# Patient Record
Sex: FEMALE | Race: WHITE | Employment: STUDENT | URBAN - METROPOLITAN AREA
[De-identification: names, ages, dates, MRNs, and addresses within clinical notes are randomized per-mention and may not be internally consistent; named-entity substitution may affect disease eponyms.]

---
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
## 2019-01-02 ASSESSMENT — ENCOUNTER SYMPTOMS
BLOOD IN STOOL: 0
CONSTIPATION: 0
SHORTNESS OF BREATH: 0
APNEA: 0
NAUSEA: 0
VOMITING: 0
DIARRHEA: 0
CHEST TIGHTNESS: 0
COUGH: 0
ABDOMINAL PAIN: 0

## 2019-01-08 ENCOUNTER — TELEPHONE (OUTPATIENT)
Dept: FAMILY MEDICINE CLINIC | Age: 19
End: 2019-01-08

## 2019-01-15 ENCOUNTER — OFFICE VISIT (OUTPATIENT)
Dept: FAMILY MEDICINE CLINIC | Age: 19
End: 2019-01-15
Payer: COMMERCIAL

## 2019-01-15 VITALS
SYSTOLIC BLOOD PRESSURE: 116 MMHG | TEMPERATURE: 98.1 F | RESPIRATION RATE: 12 BRPM | HEIGHT: 63 IN | DIASTOLIC BLOOD PRESSURE: 78 MMHG | BODY MASS INDEX: 28.21 KG/M2 | HEART RATE: 84 BPM | WEIGHT: 159.2 LBS

## 2019-01-15 DIAGNOSIS — F41.9 ANXIETY: ICD-10-CM

## 2019-01-15 PROCEDURE — G8482 FLU IMMUNIZE ORDER/ADMIN: HCPCS | Performed by: FAMILY MEDICINE

## 2019-01-15 PROCEDURE — 99213 OFFICE O/P EST LOW 20 MIN: CPT | Performed by: FAMILY MEDICINE

## 2019-01-15 PROCEDURE — 1036F TOBACCO NON-USER: CPT | Performed by: FAMILY MEDICINE

## 2019-01-15 PROCEDURE — G8417 CALC BMI ABV UP PARAM F/U: HCPCS | Performed by: FAMILY MEDICINE

## 2019-01-15 PROCEDURE — G8427 DOCREV CUR MEDS BY ELIG CLIN: HCPCS | Performed by: FAMILY MEDICINE

## 2019-01-15 RX ORDER — ESCITALOPRAM OXALATE 5 MG/1
5 TABLET ORAL DAILY
Qty: 30 TABLET | Refills: 2 | Status: SHIPPED | OUTPATIENT
Start: 2019-01-15 | End: 2019-02-22 | Stop reason: DRUGHIGH

## 2019-01-23 ASSESSMENT — ENCOUNTER SYMPTOMS
CHEST TIGHTNESS: 0
CONSTIPATION: 0
ABDOMINAL PAIN: 0
BLOOD IN STOOL: 0
VOMITING: 0
COUGH: 0
APNEA: 0
SHORTNESS OF BREATH: 0
NAUSEA: 0
DIARRHEA: 0

## 2019-02-05 ENCOUNTER — OFFICE VISIT (OUTPATIENT)
Dept: BEHAVIORAL/MENTAL HEALTH CLINIC | Age: 19
End: 2019-02-05
Payer: COMMERCIAL

## 2019-02-05 DIAGNOSIS — F41.1 GENERALIZED ANXIETY DISORDER: Primary | ICD-10-CM

## 2019-02-05 DIAGNOSIS — F33.1 MAJOR DEPRESSIVE DISORDER, RECURRENT EPISODE, MODERATE WITH ANXIOUS DISTRESS (HCC): ICD-10-CM

## 2019-02-05 PROCEDURE — 90791 PSYCH DIAGNOSTIC EVALUATION: CPT | Performed by: PSYCHOLOGIST

## 2019-02-05 ASSESSMENT — PATIENT HEALTH QUESTIONNAIRE - PHQ9
SUM OF ALL RESPONSES TO PHQ9 QUESTIONS 1 & 2: 1
4. FEELING TIRED OR HAVING LITTLE ENERGY: 1
6. FEELING BAD ABOUT YOURSELF - OR THAT YOU ARE A FAILURE OR HAVE LET YOURSELF OR YOUR FAMILY DOWN: 1
8. MOVING OR SPEAKING SO SLOWLY THAT OTHER PEOPLE COULD HAVE NOTICED. OR THE OPPOSITE, BEING SO FIGETY OR RESTLESS THAT YOU HAVE BEEN MOVING AROUND A LOT MORE THAN USUAL: 2
SUM OF ALL RESPONSES TO PHQ QUESTIONS 1-9: 10
3. TROUBLE FALLING OR STAYING ASLEEP: 2
1. LITTLE INTEREST OR PLEASURE IN DOING THINGS: 0
2. FEELING DOWN, DEPRESSED OR HOPELESS: 1
SUM OF ALL RESPONSES TO PHQ QUESTIONS 1-9: 10
5. POOR APPETITE OR OVEREATING: 1
7. TROUBLE CONCENTRATING ON THINGS, SUCH AS READING THE NEWSPAPER OR WATCHING TELEVISION: 1
9. THOUGHTS THAT YOU WOULD BE BETTER OFF DEAD, OR OF HURTING YOURSELF: 1
10. IF YOU CHECKED OFF ANY PROBLEMS, HOW DIFFICULT HAVE THESE PROBLEMS MADE IT FOR YOU TO DO YOUR WORK, TAKE CARE OF THINGS AT HOME, OR GET ALONG WITH OTHER PEOPLE: 1

## 2019-02-19 ENCOUNTER — OFFICE VISIT (OUTPATIENT)
Dept: FAMILY MEDICINE CLINIC | Age: 19
End: 2019-02-19
Payer: COMMERCIAL

## 2019-02-19 VITALS
BODY MASS INDEX: 27.74 KG/M2 | HEIGHT: 63 IN | OXYGEN SATURATION: 99 % | SYSTOLIC BLOOD PRESSURE: 116 MMHG | DIASTOLIC BLOOD PRESSURE: 78 MMHG | WEIGHT: 156.56 LBS | TEMPERATURE: 98.6 F | RESPIRATION RATE: 16 BRPM | HEART RATE: 93 BPM

## 2019-02-19 DIAGNOSIS — F41.9 ANXIETY: ICD-10-CM

## 2019-02-19 PROCEDURE — 1036F TOBACCO NON-USER: CPT | Performed by: FAMILY MEDICINE

## 2019-02-19 PROCEDURE — G8417 CALC BMI ABV UP PARAM F/U: HCPCS | Performed by: FAMILY MEDICINE

## 2019-02-19 PROCEDURE — 99213 OFFICE O/P EST LOW 20 MIN: CPT | Performed by: FAMILY MEDICINE

## 2019-02-19 PROCEDURE — G8427 DOCREV CUR MEDS BY ELIG CLIN: HCPCS | Performed by: FAMILY MEDICINE

## 2019-02-19 PROCEDURE — G8482 FLU IMMUNIZE ORDER/ADMIN: HCPCS | Performed by: FAMILY MEDICINE

## 2019-02-19 RX ORDER — HYDROXYZINE HYDROCHLORIDE 10 MG/1
10 TABLET, FILM COATED ORAL 3 TIMES DAILY PRN
Qty: 42 TABLET | Refills: 0 | Status: CANCELLED | OUTPATIENT
Start: 2019-02-19

## 2019-02-19 ASSESSMENT — PATIENT HEALTH QUESTIONNAIRE - PHQ9
SUM OF ALL RESPONSES TO PHQ QUESTIONS 1-9: 1
SUM OF ALL RESPONSES TO PHQ QUESTIONS 1-9: 1
2. FEELING DOWN, DEPRESSED OR HOPELESS: 1
1. LITTLE INTEREST OR PLEASURE IN DOING THINGS: 0
SUM OF ALL RESPONSES TO PHQ9 QUESTIONS 1 & 2: 1

## 2019-02-22 DIAGNOSIS — F41.9 ANXIETY: Primary | ICD-10-CM

## 2019-02-22 RX ORDER — ESCITALOPRAM OXALATE 10 MG/1
10 TABLET ORAL DAILY
Qty: 30 TABLET | Refills: 1 | Status: SHIPPED | OUTPATIENT
Start: 2019-02-22 | End: 2019-03-06 | Stop reason: DRUGHIGH

## 2019-02-24 ASSESSMENT — ENCOUNTER SYMPTOMS
APNEA: 0
NAUSEA: 0
COUGH: 0
ABDOMINAL PAIN: 0
SHORTNESS OF BREATH: 0
BLOOD IN STOOL: 0
CONSTIPATION: 0
VOMITING: 0
CHEST TIGHTNESS: 0
DIARRHEA: 0

## 2019-03-04 ENCOUNTER — PATIENT MESSAGE (OUTPATIENT)
Dept: FAMILY MEDICINE CLINIC | Age: 19
End: 2019-03-04

## 2019-03-06 RX ORDER — ESCITALOPRAM OXALATE 5 MG/1
5 TABLET ORAL DAILY
Qty: 30 TABLET | Refills: 3 | Status: SHIPPED | OUTPATIENT
Start: 2019-03-06 | End: 2019-04-16 | Stop reason: SDUPTHER

## 2019-03-13 ENCOUNTER — HOSPITAL ENCOUNTER (EMERGENCY)
Age: 19
Discharge: HOME | End: 2019-03-13
Attending: EMERGENCY MEDICINE
Payer: COMMERCIAL

## 2019-03-13 ENCOUNTER — TELEPHONE (OUTPATIENT)
Dept: FAMILY MEDICINE CLINIC | Age: 19
End: 2019-03-13

## 2019-03-13 ENCOUNTER — TELEPHONE (OUTPATIENT)
Dept: ADMINISTRATIVE | Age: 19
End: 2019-03-13

## 2019-03-13 ENCOUNTER — APPOINTMENT (OUTPATIENT)
Dept: CT IMAGING | Age: 19
End: 2019-03-13
Payer: COMMERCIAL

## 2019-03-13 VITALS
OXYGEN SATURATION: 99 % | SYSTOLIC BLOOD PRESSURE: 130 MMHG | RESPIRATION RATE: 16 BRPM | HEIGHT: 63 IN | TEMPERATURE: 97.6 F | DIASTOLIC BLOOD PRESSURE: 79 MMHG | BODY MASS INDEX: 25.69 KG/M2 | HEART RATE: 102 BPM | WEIGHT: 145 LBS

## 2019-03-13 DIAGNOSIS — G43.019 INTRACTABLE MIGRAINE WITHOUT AURA AND WITHOUT STATUS MIGRAINOSUS: ICD-10-CM

## 2019-03-13 DIAGNOSIS — S09.90XA CLOSED HEAD INJURY, INITIAL ENCOUNTER: Primary | ICD-10-CM

## 2019-03-13 PROCEDURE — 99283 EMERGENCY DEPT VISIT LOW MDM: CPT

## 2019-03-13 PROCEDURE — 70450 CT HEAD/BRAIN W/O DYE: CPT

## 2019-03-13 PROCEDURE — 6370000000 HC RX 637 (ALT 250 FOR IP): Performed by: EMERGENCY MEDICINE

## 2019-03-13 PROCEDURE — 6360000002 HC RX W HCPCS: Performed by: EMERGENCY MEDICINE

## 2019-03-13 PROCEDURE — 96372 THER/PROPH/DIAG INJ SC/IM: CPT

## 2019-03-13 RX ORDER — KETOROLAC TROMETHAMINE 30 MG/ML
60 INJECTION, SOLUTION INTRAMUSCULAR; INTRAVENOUS ONCE
Status: COMPLETED | OUTPATIENT
Start: 2019-03-13 | End: 2019-03-13

## 2019-03-13 RX ORDER — NITROFURANTOIN MACROCRYSTALS 50 MG/1
50 CAPSULE ORAL DAILY
COMMUNITY
End: 2020-04-03

## 2019-03-13 RX ORDER — TAMSULOSIN HYDROCHLORIDE 0.4 MG/1
0.4 CAPSULE ORAL DAILY
COMMUNITY

## 2019-03-13 RX ORDER — ONDANSETRON 4 MG/1
4 TABLET, ORALLY DISINTEGRATING ORAL ONCE
Status: COMPLETED | OUTPATIENT
Start: 2019-03-13 | End: 2019-03-13

## 2019-03-13 RX ADMIN — ONDANSETRON 4 MG: 4 TABLET, ORALLY DISINTEGRATING ORAL at 11:13

## 2019-03-13 RX ADMIN — KETOROLAC TROMETHAMINE 60 MG: 30 INJECTION, SOLUTION INTRAMUSCULAR at 11:12

## 2019-03-13 ASSESSMENT — ENCOUNTER SYMPTOMS
VOICE CHANGE: 0
CONSTIPATION: 0
SINUS PRESSURE: 0
SHORTNESS OF BREATH: 0
DIARRHEA: 0
BLOOD IN STOOL: 0
CHEST TIGHTNESS: 0
EYE PAIN: 0
BACK PAIN: 0
COUGH: 0
PHOTOPHOBIA: 1
WHEEZING: 0
ABDOMINAL PAIN: 0
STRIDOR: 0
TROUBLE SWALLOWING: 0
EYE DISCHARGE: 0
EYE REDNESS: 0
VOMITING: 0
CHOKING: 0
SORE THROAT: 0
FACIAL SWELLING: 0

## 2019-03-13 ASSESSMENT — PAIN - FUNCTIONAL ASSESSMENT: PAIN_FUNCTIONAL_ASSESSMENT: ACTIVITIES ARE NOT PREVENTED

## 2019-03-13 ASSESSMENT — PAIN DESCRIPTION - DESCRIPTORS: DESCRIPTORS: ACHING

## 2019-03-13 ASSESSMENT — PAIN DESCRIPTION - FREQUENCY: FREQUENCY: INTERMITTENT

## 2019-03-13 ASSESSMENT — PAIN SCALES - GENERAL
PAINLEVEL_OUTOF10: 3
PAINLEVEL_OUTOF10: 5

## 2019-03-13 ASSESSMENT — PAIN DESCRIPTION - PROGRESSION: CLINICAL_PROGRESSION: NOT CHANGED

## 2019-03-13 ASSESSMENT — PAIN DESCRIPTION - ONSET: ONSET: ON-GOING

## 2019-03-13 ASSESSMENT — PAIN DESCRIPTION - PAIN TYPE: TYPE: ACUTE PAIN

## 2019-03-13 ASSESSMENT — PAIN DESCRIPTION - LOCATION: LOCATION: HEAD

## 2019-03-15 ENCOUNTER — OFFICE VISIT (OUTPATIENT)
Dept: FAMILY MEDICINE CLINIC | Age: 19
End: 2019-03-15
Payer: COMMERCIAL

## 2019-03-15 VITALS
DIASTOLIC BLOOD PRESSURE: 82 MMHG | TEMPERATURE: 99 F | WEIGHT: 167 LBS | BODY MASS INDEX: 29.59 KG/M2 | RESPIRATION RATE: 12 BRPM | HEART RATE: 93 BPM | HEIGHT: 63 IN | SYSTOLIC BLOOD PRESSURE: 118 MMHG | OXYGEN SATURATION: 99 %

## 2019-03-15 DIAGNOSIS — R41.3 ACUTE MEMORY IMPAIRMENT: ICD-10-CM

## 2019-03-15 DIAGNOSIS — F41.9 ANXIETY: ICD-10-CM

## 2019-03-15 DIAGNOSIS — R51.9 ACUTE NONINTRACTABLE HEADACHE, UNSPECIFIED HEADACHE TYPE: Primary | ICD-10-CM

## 2019-03-15 PROCEDURE — G8482 FLU IMMUNIZE ORDER/ADMIN: HCPCS | Performed by: FAMILY MEDICINE

## 2019-03-15 PROCEDURE — G8417 CALC BMI ABV UP PARAM F/U: HCPCS | Performed by: FAMILY MEDICINE

## 2019-03-15 PROCEDURE — 99214 OFFICE O/P EST MOD 30 MIN: CPT | Performed by: FAMILY MEDICINE

## 2019-03-15 PROCEDURE — G8427 DOCREV CUR MEDS BY ELIG CLIN: HCPCS | Performed by: FAMILY MEDICINE

## 2019-03-15 PROCEDURE — 1036F TOBACCO NON-USER: CPT | Performed by: FAMILY MEDICINE

## 2019-03-16 ENCOUNTER — HOSPITAL ENCOUNTER (OUTPATIENT)
Dept: LAB | Age: 19
Discharge: HOME | End: 2019-03-16
Payer: COMMERCIAL

## 2019-03-16 DIAGNOSIS — R41.3 ACUTE MEMORY IMPAIRMENT: ICD-10-CM

## 2019-03-16 LAB
ANION GAP SERPL CALCULATED.3IONS-SCNC: 15 MEQ/L (ref 9–15)
BASOPHILS ABSOLUTE: 0 K/UL (ref 0–0.2)
BASOPHILS RELATIVE PERCENT: 0.4 %
BUN BLDV-MCNC: 10 MG/DL (ref 6–20)
CALCIUM SERPL-MCNC: 8.9 MG/DL (ref 8.5–9.9)
CHLORIDE BLD-SCNC: 105 MEQ/L (ref 95–107)
CO2: 20 MEQ/L (ref 20–31)
CREAT SERPL-MCNC: 0.6 MG/DL (ref 0.5–0.9)
EOSINOPHILS ABSOLUTE: 0.1 K/UL (ref 0–0.7)
EOSINOPHILS RELATIVE PERCENT: 2.3 %
GFR AFRICAN AMERICAN: >60
GFR NON-AFRICAN AMERICAN: >60
GLUCOSE BLD-MCNC: 87 MG/DL (ref 70–99)
HCT VFR BLD CALC: 41.5 % (ref 37–47)
HEMOGLOBIN: 14.1 G/DL (ref 12–16)
LYMPHOCYTES ABSOLUTE: 2.4 K/UL (ref 1–4.8)
LYMPHOCYTES RELATIVE PERCENT: 42.1 %
MCH RBC QN AUTO: 28.8 PG (ref 27–31.3)
MCHC RBC AUTO-ENTMCNC: 33.9 % (ref 33–37)
MCV RBC AUTO: 84.9 FL (ref 82–100)
MONOCYTES ABSOLUTE: 0.4 K/UL (ref 0.2–0.8)
MONOCYTES RELATIVE PERCENT: 7.6 %
NEUTROPHILS ABSOLUTE: 2.8 K/UL (ref 1.4–6.5)
NEUTROPHILS RELATIVE PERCENT: 47.6 %
PDW BLD-RTO: 13.7 % (ref 11.5–14.5)
PLATELET # BLD: 318 K/UL (ref 130–400)
POTASSIUM SERPL-SCNC: 4.6 MEQ/L (ref 3.4–4.9)
RBC # BLD: 4.88 M/UL (ref 4.2–5.4)
SODIUM BLD-SCNC: 140 MEQ/L (ref 135–144)
VITAMIN B-12: 304 PG/ML (ref 232–1245)
WBC # BLD: 5.8 K/UL (ref 4.5–11)

## 2019-03-16 PROCEDURE — 82607 VITAMIN B-12: CPT

## 2019-03-16 PROCEDURE — 36415 COLL VENOUS BLD VENIPUNCTURE: CPT

## 2019-03-16 PROCEDURE — 80048 BASIC METABOLIC PNL TOTAL CA: CPT

## 2019-03-16 PROCEDURE — 85025 COMPLETE CBC W/AUTO DIFF WBC: CPT

## 2019-03-22 ASSESSMENT — ENCOUNTER SYMPTOMS
SHORTNESS OF BREATH: 0
APNEA: 0
CONSTIPATION: 0
BLOOD IN STOOL: 0
VOMITING: 0
COUGH: 0
NAUSEA: 0
ABDOMINAL PAIN: 0
DIARRHEA: 0
CHEST TIGHTNESS: 0

## 2019-04-16 ENCOUNTER — TELEPHONE (OUTPATIENT)
Dept: FAMILY MEDICINE CLINIC | Age: 19
End: 2019-04-16

## 2019-04-16 DIAGNOSIS — F41.9 ANXIETY: Primary | ICD-10-CM

## 2019-04-16 RX ORDER — ESCITALOPRAM OXALATE 5 MG/1
5 TABLET ORAL DAILY
Qty: 90 TABLET | Refills: 1 | Status: SHIPPED | OUTPATIENT
Start: 2019-04-16 | End: 2019-11-06 | Stop reason: SDUPTHER

## 2019-04-16 NOTE — TELEPHONE ENCOUNTER
Patient's Mom wants to know if you can escitalopram (LEXAPRO) 5 MG tablet for 90 days to [facility] - [address], [address] [place] [address] Call patient when complete [phone]

## 2019-04-25 ENCOUNTER — HOSPITAL ENCOUNTER (OUTPATIENT)
Age: 19
Setting detail: SPECIMEN
Discharge: HOME | End: 2019-04-25
Payer: COMMERCIAL

## 2019-04-25 ENCOUNTER — OFFICE VISIT (OUTPATIENT)
Dept: FAMILY MEDICINE CLINIC | Age: 19
End: 2019-04-25
Payer: COMMERCIAL

## 2019-04-25 VITALS
SYSTOLIC BLOOD PRESSURE: 120 MMHG | RESPIRATION RATE: 24 BRPM | HEIGHT: 63 IN | TEMPERATURE: 97.1 F | HEART RATE: 106 BPM | BODY MASS INDEX: 29.95 KG/M2 | DIASTOLIC BLOOD PRESSURE: 74 MMHG | WEIGHT: 169 LBS | OXYGEN SATURATION: 98 %

## 2019-04-25 DIAGNOSIS — J02.9 SORE THROAT: ICD-10-CM

## 2019-04-25 DIAGNOSIS — H66.001 ACUTE SUPPURATIVE OTITIS MEDIA OF RIGHT EAR WITHOUT SPONTANEOUS RUPTURE OF TYMPANIC MEMBRANE, RECURRENCE NOT SPECIFIED: Primary | ICD-10-CM

## 2019-04-25 LAB — S PYO AG THROAT QL: NORMAL

## 2019-04-25 PROCEDURE — 87880 STREP A ASSAY W/OPTIC: CPT | Performed by: NURSE PRACTITIONER

## 2019-04-25 PROCEDURE — 99213 OFFICE O/P EST LOW 20 MIN: CPT | Performed by: NURSE PRACTITIONER

## 2019-04-25 PROCEDURE — 87070 CULTURE OTHR SPECIMN AEROBIC: CPT

## 2019-04-25 RX ORDER — AMOXICILLIN 875 MG/1
875 TABLET, COATED ORAL 2 TIMES DAILY
Qty: 20 TABLET | Refills: 0 | Status: SHIPPED | OUTPATIENT
Start: 2019-04-25 | End: 2019-05-05

## 2019-04-25 ASSESSMENT — ENCOUNTER SYMPTOMS
SORE THROAT: 1
SHORTNESS OF BREATH: 0
COUGH: 1
DIARRHEA: 0
SINUS PRESSURE: 1
RHINORRHEA: 1
ABDOMINAL PAIN: 0
VOMITING: 0
NAUSEA: 1
WHEEZING: 0

## 2019-04-25 NOTE — PROGRESS NOTES
Subjective  Luis Bar, 22 y.o. female presents today with:  Chief Complaint   Patient presents with    Pharyngitis     C/O sore throat 3x days. Patient tried cough drops with some relief      Luis Bar is a 22 y.o. female who presents for evaluation of sore throat. Associated symptoms include suspected fevers but not measured at home, chills, dry cough, right ear pain, headache, nasal blockage, pain while swallowing, sinus and nasal congestion and sore throat. Onset of symptoms was 3 days ago, gradually worsening since that time. She is drinking plenty of fluids. She has had recent close exposure to someone with proven streptococcal pharyngitis. Patient's medications, allergies, past medical, surgical, social and family histories were reviewed and updated as appropriate. Review of Systems   Constitutional: Positive for chills, fatigue and fever. HENT: Positive for congestion, ear pain, rhinorrhea, sinus pressure and sore throat. Negative for postnasal drip. Respiratory: Positive for cough. Negative for shortness of breath and wheezing. Cardiovascular: Negative for chest pain. Gastrointestinal: Positive for nausea. Negative for abdominal pain, diarrhea and vomiting. Genitourinary: Negative for dysuria, flank pain and frequency. Musculoskeletal: Positive for myalgias. Negative for neck stiffness. Neurological: Positive for headaches (recent concussion). Negative for dizziness and light-headedness. Hematological: Negative for adenopathy.      Objective  Vitals:    04/25/19 1652   BP: 120/74   Site: Left Upper Arm   Position: Sitting   Cuff Size: Small Adult   Pulse: 106   Resp: 24   Temp: 97.1 °F (36.2 °C)   TempSrc: Temporal   SpO2: 98%   Weight: 169 lb (76.7 kg)   Height: 5' 3\" (1.6 m)     Physical Exam   General Appearance:  Alert, cooperative, no distress, appears stated age  Head:  Normocephalic, without obvious abnormality, atraumatic  Eyes:  PERRL, conjunctiva/corneas clear, EOM's intact  both eyes  Ears:  R TM bulging, erythematous. L TM non-erythematous. Middle ear effusion. External canals clear, both ears  Nose: Nares normal, septum midline,mucosa normal, no drainage or sinus tenderness  Throat: Lips, mucosa, and tongue normal; teeth and gums normal. Oropharynx erythematous without exudate or edema  Neck: Supple, symmetrical, trachea midline, no adenopathy;  thyroid: not enlarged, symmetric, no tenderness/mass/nodules; no carotid bruit or JVD  Back:   Symmetric, no curvature, ROM normal, no CVA tenderness  Lungs:   Clear to auscultation bilaterally, respirations unlabored  Heart:  Regular rate and rhythm, S1 and S2 normal, no murmur, rub, or gallop  Extremities: Extremities normal, atraumatic, no cyanosis or edema  Skin: Skin color, texture, turgor normal, no visible rashes   Lymph nodes: Cervical, supraclavicular nodes normal  Neurologic: Normal   Vital signs reviewed    POC Testing Today:   Results for POC orders placed in visit on 04/25/19   POCT rapid strep A   Result Value Ref Range    Strep A Ag None Detected None Detected     Assessment & Plan   18 y. o.female presents w/ three days of worsening pharyngitis. Exam consistent with R otitis media. Pt placed on antibiotics. Instructions on supportive care and return precautions provided. Diagnoses and all orders for this visit:    Acute suppurative otitis media of right ear without spontaneous rupture of tympanic membrane, recurrence not specified  -     amoxicillin (AMOXIL) 875 MG tablet; Take 1 tablet by mouth 2 times daily for 10 days  - OTC analgesics per package instructions as needed for fever/pain  - Call/return if symptoms fail to improve in 2-3 days or worsen in any way    Sore throat  -     POCT rapid strep A  -     Throat culture; Future        - Use of OTC analgesics recommended as well as salt water gargles. - Follow up as needed.       Antibiotic Instructions: Complete the full course of antibiotics as ordered. Take each dose with a small snack or meal to lessen potential GI upset. To prevent antibiotic resistance, please take medication as ordered and for the full duration even if you start to feel better. Consider intake of yogurt or probiotic during antibiotic use and for a few days after to help reduce the risk of developing a secondary infection. Separate the yogurt and antibiotic by at least 1 hour. Avoid alcohol while taking antibiotics. Side effects and adverse effects of any medication prescribed today, as well as treatment plan/rationale, follow-up care, and result expectations have been discussed with the patient. Priya expresses understanding and wishes to proceed with the plan. Discussed signs and symptoms which require immediate follow-up in ED/call to 911. Understanding verbalized. I have reviewed and updated the electronic medical record.     Quita Rinne, APRN - CNP

## 2019-04-25 NOTE — PATIENT INSTRUCTIONS
The following comfort measures might be helpful:   Adequate rest and hydration    Over the counter pain relievers/ fever reducers as needed   2 Tbsp honey mixed w/warm tea or water or warm salt water gargles (1/2 teaspoon in 8oz H20) for sore throat   Vaporizer, humidifier, steamy showers, warm facial packs, sleeping w/ head of bed elevated    Prevention measures might include:   Avoid unfavorable environmental factors such as allergens, cigarette smoke, pollution as applicable   Frequent hand washing, cover your cough    Cover coughs and sneezes with the elbow or sleeve, not the hand       Antibiotic Instructions: Complete the full course of antibiotics as ordered. Take each dose with a small snack or meal to lessen potential GI upset. To prevent antibiotic resistance, please take medication as ordered and for the full duration even if you start to feel better. If you are using oral contraception, please use a back-up method of contraception such as condoms during antibiotic use and for 7 days after completing treatment to prevent pregnancy. Consider intake of yogurt or probiotic during antibiotic use and for a few days after to help reduce the risk of developing a secondary infection. Separate the yogurt and antibiotic by at least 1 hour. Avoid alcohol while taking antibiotics. Patient Education     Ear Infection (Otitis Media): Care Instructions  Your Care Instructions    An ear infection may start with a cold and affect the middle ear (otitis media). It can hurt a lot. Most ear infections clear up on their own in a couple of days. Most often you will not need antibiotics. This is because many ear infections are caused by a virus. Antibiotics don't work against a virus. Regular doses of pain medicines are the best way to reduce your fever and help you feel better. Follow-up care is a key part of your treatment and safety.  Be sure to make and go to all appointments, and call your doctor if you are having problems. It's also a good idea to know your test results and keep a list of the medicines you take. How can you care for yourself at home? · Take pain medicines exactly as directed. ? If the doctor gave you a prescription medicine for pain, take it as prescribed. ? If you are not taking a prescription pain medicine, take an over-the-counter medicine, such as acetaminophen (Tylenol), ibuprofen (Advil, Motrin), or naproxen (Aleve). Read and follow all instructions on the label. ? Do not take two or more pain medicines at the same time unless the doctor told you to. Many pain medicines have acetaminophen, which is Tylenol. Too much acetaminophen (Tylenol) can be harmful. · Plan to take a full dose of pain reliever before bedtime. Getting enough sleep will help you get better. · Try a warm, moist washcloth on the ear. It may help relieve pain. · If your doctor prescribed antibiotics, take them as directed. Do not stop taking them just because you feel better. You need to take the full course of antibiotics. When should you call for help? Call your doctor now or seek immediate medical care if:    · You have new or increasing ear pain.     · You have new or increasing pus or blood draining from your ear.     · You have a fever with a stiff neck or a severe headache.    Watch closely for changes in your health, and be sure to contact your doctor if:    · You have new or worse symptoms.     · You are not getting better after taking an antibiotic for 2 days. Where can you learn more? Go to https://YouView.Disqus. org and sign in to your Cloud[employer] account. Enter L518 in the [facility] box to learn more about \"Ear Infection (Otitis Media): Care Instructions. \"     If you do not have an account, please click on the \"Sign Up Now\" link. Current as of: March 27, 2018  Content Version: 11.9  © 4120-6182 [employer]. Care instructions adapted under license by [facility] ([facility]). If you have questions about a medical condition or this instruction, always ask your healthcare professional. [place] [address] any warranty or liability for your use of this information.

## 2019-04-28 LAB — THROAT CULTURE: NORMAL

## 2020-03-02 ENCOUNTER — OFFICE VISIT (OUTPATIENT)
Dept: FAMILY MEDICINE CLINIC | Age: 20
End: 2020-03-02
Payer: COMMERCIAL

## 2020-03-02 ENCOUNTER — HOSPITAL ENCOUNTER (OUTPATIENT)
Age: 20
Setting detail: SPECIMEN
Discharge: HOME | End: 2020-03-02
Payer: COMMERCIAL

## 2020-03-02 VITALS
OXYGEN SATURATION: 97 % | RESPIRATION RATE: 16 BRPM | BODY MASS INDEX: 34.66 KG/M2 | HEIGHT: 63 IN | WEIGHT: 195.6 LBS | DIASTOLIC BLOOD PRESSURE: 84 MMHG | HEART RATE: 119 BPM | SYSTOLIC BLOOD PRESSURE: 136 MMHG | TEMPERATURE: 97.9 F

## 2020-03-02 LAB
BILIRUBIN, POC: ABNORMAL
BLOOD URINE, POC: ABNORMAL
CLARITY, POC: ABNORMAL
COLOR, POC: YELLOW
GLUCOSE URINE, POC: ABNORMAL
HCG, URINE, POC: NEGATIVE
KETONES, POC: ABNORMAL
LEUKOCYTE EST, POC: ABNORMAL
Lab: NORMAL
NEGATIVE QC PASS/FAIL: NORMAL
NITRITE, POC: ABNORMAL
PH, POC: 6
POSITIVE QC PASS/FAIL: NORMAL
PROTEIN, POC: ABNORMAL
SPECIFIC GRAVITY, POC: 1.03
UROBILINOGEN, POC: ABNORMAL

## 2020-03-02 PROCEDURE — G8427 DOCREV CUR MEDS BY ELIG CLIN: HCPCS | Performed by: PHYSICIAN ASSISTANT

## 2020-03-02 PROCEDURE — G8417 CALC BMI ABV UP PARAM F/U: HCPCS | Performed by: PHYSICIAN ASSISTANT

## 2020-03-02 PROCEDURE — 87086 URINE CULTURE/COLONY COUNT: CPT

## 2020-03-02 PROCEDURE — 81025 URINE PREGNANCY TEST: CPT | Performed by: PHYSICIAN ASSISTANT

## 2020-03-02 PROCEDURE — G8484 FLU IMMUNIZE NO ADMIN: HCPCS | Performed by: PHYSICIAN ASSISTANT

## 2020-03-02 PROCEDURE — 99213 OFFICE O/P EST LOW 20 MIN: CPT | Performed by: PHYSICIAN ASSISTANT

## 2020-03-02 PROCEDURE — 1036F TOBACCO NON-USER: CPT | Performed by: PHYSICIAN ASSISTANT

## 2020-03-02 PROCEDURE — 81002 URINALYSIS NONAUTO W/O SCOPE: CPT | Performed by: PHYSICIAN ASSISTANT

## 2020-03-02 RX ORDER — SULFAMETHOXAZOLE AND TRIMETHOPRIM 800; 160 MG/1; MG/1
1 TABLET ORAL 2 TIMES DAILY
Qty: 14 TABLET | Refills: 0 | Status: SHIPPED | OUTPATIENT
Start: 2020-03-02 | End: 2020-03-09

## 2020-03-02 RX ORDER — ONDANSETRON 8 MG/1
8 TABLET, ORALLY DISINTEGRATING ORAL EVERY 8 HOURS PRN
Qty: 8 TABLET | Refills: 0 | Status: SHIPPED | OUTPATIENT
Start: 2020-03-02 | End: 2020-05-19

## 2020-03-02 ASSESSMENT — PATIENT HEALTH QUESTIONNAIRE - PHQ9
SUM OF ALL RESPONSES TO PHQ9 QUESTIONS 1 & 2: 0
1. LITTLE INTEREST OR PLEASURE IN DOING THINGS: 0
2. FEELING DOWN, DEPRESSED OR HOPELESS: 0
SUM OF ALL RESPONSES TO PHQ QUESTIONS 1-9: 0
SUM OF ALL RESPONSES TO PHQ QUESTIONS 1-9: 0

## 2020-03-03 NOTE — PROGRESS NOTES
Subjective     Peggy Greenwood 20 y.o. female presents 3/4/20 with   Chief Complaint   Patient presents with    Nausea     Patient presents today nausea that has been ongoing x3 days. Patient states she has not thrown up but has came very close to feeling as if she is. Patient states there is some abdominal pain and she is only able to stomach toast and bananas. Urinary Tract Infection    This is a new problem. The current episode started in the past 7 days (x 3 days). The problem occurs every urination. The problem has been unchanged. The quality of the pain is described as aching. The pain is mild. There has been no fever. She is sexually active. There is no history of pyelonephritis. Associated symptoms include frequency, nausea and urgency. Pertinent negatives include no chills, hematuria, possible pregnancy, sweats or vomiting. She has tried nothing for the symptoms. Pt also states her anxiety has been worse than usual which may be contributing to her nausea. She takes Lexapro. Reviewed the following history:    Past Medical History:   Diagnosis Date    Concussion     Leg pain     Ovarian cyst rupture     UTI (urinary tract infection)      Past Surgical History:   Procedure Laterality Date    MOUTH SURGERY      wisdom teeth     No family history on file.     No Known Allergies    Current Outpatient Medications   Medication Sig Dispense Refill    sulfamethoxazole-trimethoprim (BACTRIM DS) 800-160 MG per tablet Take 1 tablet by mouth 2 times daily for 7 days 14 tablet 0    ondansetron (ZOFRAN ODT) 8 MG TBDP disintegrating tablet Place 1 tablet under the tongue every 8 hours as needed for Nausea or Vomiting 8 tablet 0    escitalopram (LEXAPRO) 5 MG tablet Take 1 tablet by mouth daily 90 tablet 1    tamsulosin (FLOMAX) 0.4 MG capsule Take 0.4 mg by mouth daily      nitrofurantoin (MACRODANTIN) 50 MG capsule Take 50 mg by mouth Daily      Biotin w/ Vitamins C & E (HAIR SKIN & NAILS GUMMIES PO) Take by mouth      Multiple Vitamins-Minerals (MULTIVITAMIN GUMMIES ADULT PO) Take by mouth      Levonorgest-Eth Estrad 91-Day 0.1-0.02 & 0.01 MG TABS Take by mouth       No current facility-administered medications for this visit. Review of Systems   Constitutional: Negative for chills and fever. HENT: Negative for sore throat. Respiratory: Negative for cough. Cardiovascular: Negative for chest pain. Gastrointestinal: Positive for abdominal pain (lower), diarrhea (x 1 ) and nausea. Negative for blood in stool and vomiting. Genitourinary: Positive for frequency and urgency. Negative for hematuria. Musculoskeletal: Negative for back pain and myalgias. Objective    Vitals:    03/02/20 1947   BP: 136/84   Site: Right Upper Arm   Position: Sitting   Cuff Size: Large Adult   Pulse: 119   Resp: 16   Temp: 97.9 °F (36.6 °C)   TempSrc: Temporal   SpO2: 97%   Weight: 195 lb 9.6 oz (88.7 kg)   Height: 5' 3\" (1.6 m)       Physical Exam  Vitals signs reviewed. Constitutional:       General: She is not in acute distress. Appearance: She is well-developed. She is not ill-appearing, toxic-appearing or diaphoretic. HENT:      Head: Normocephalic and atraumatic. Eyes:      General:         Right eye: No discharge. Left eye: No discharge. Conjunctiva/sclera: Conjunctivae normal.   Cardiovascular:      Rate and Rhythm: Normal rate and regular rhythm. Heart sounds: Normal heart sounds. Pulmonary:      Effort: Pulmonary effort is normal.      Breath sounds: Normal breath sounds. Abdominal:      General: There is no distension. Palpations: Abdomen is soft. Tenderness: There is no abdominal tenderness. There is no right CVA tenderness or left CVA tenderness. Skin:     Coloration: Skin is not pale. Assessment and Plan      ICD-10-CM    1. Lower abdominal pain R10.30 POCT Urinalysis no Micro     POC Pregnancy Ur-Qual     Urine Culture   2.  Acute cystitis without hematuria N30.00    3. Nausea R11.0        Orders Placed This Encounter   Procedures    Urine Culture     Standing Status:   Future     Number of Occurrences:   1     Standing Expiration Date:   3/2/2021     Order Specific Question:   Specify (ex-cath, midstream, cysto, etc)? Answer:   midstream    POCT Urinalysis no Micro    POC Pregnancy Ur-Qual     Results for POC orders placed in visit on 03/02/20   POCT Urinalysis no Micro   Result Value Ref Range    Color, UA Yellow     Clarity, UA Cloudy     Glucose, UA POC -     Bilirubin, UA -     Ketones, UA 3+     Spec Grav, UA 1.030     Blood, UA POC -     pH, UA 6.0     Protein, UA POC -     Urobilinogen, UA +-     Leukocytes, UA 2+     Nitrite, UA -        Orders Placed This Encounter   Medications    sulfamethoxazole-trimethoprim (BACTRIM DS) 800-160 MG per tablet     Sig: Take 1 tablet by mouth 2 times daily for 7 days     Dispense:  14 tablet     Refill:  0    ondansetron (ZOFRAN ODT) 8 MG TBDP disintegrating tablet     Sig: Place 1 tablet under the tongue every 8 hours as needed for Nausea or Vomiting     Dispense:  8 tablet     Refill:  0       Reviewed with the patient: current clinicalstatus, medications, activities and diet. Side effects, adverse effects of the medication prescribed today, as well as treatment plan and result expectations have been discussed with the patient who expressesunderstanding and desires to proceed. Close follow up to evaluate treatment results and for coordination of care. I have reviewed the patient's medical history in detail and updated the computerized patientrecord. Return if symptoms worsen or fail to improve, for follow up with PCP.     GIULIANA Hernandez

## 2020-03-04 ASSESSMENT — ENCOUNTER SYMPTOMS
COUGH: 0
ABDOMINAL PAIN: 1
BLOOD IN STOOL: 0
VOMITING: 0
SORE THROAT: 0
NAUSEA: 1
DIARRHEA: 1
BACK PAIN: 0

## 2020-03-05 LAB — URINE CULTURE, ROUTINE: NORMAL

## 2020-04-03 ENCOUNTER — VIRTUAL VISIT (OUTPATIENT)
Dept: FAMILY MEDICINE CLINIC | Age: 20
End: 2020-04-03
Payer: COMMERCIAL

## 2020-04-03 PROCEDURE — 99214 OFFICE O/P EST MOD 30 MIN: CPT | Performed by: FAMILY MEDICINE

## 2020-04-03 RX ORDER — CLONAZEPAM 0.5 MG/1
0.25 TABLET ORAL 2 TIMES DAILY PRN
Qty: 8 TABLET | Refills: 0 | Status: SHIPPED | OUTPATIENT
Start: 2020-04-03 | End: 2020-05-19

## 2020-04-03 ASSESSMENT — ENCOUNTER SYMPTOMS
ABDOMINAL PAIN: 0
DIARRHEA: 0
NAUSEA: 0
CHEST TIGHTNESS: 0
CONSTIPATION: 0
SHORTNESS OF BREATH: 0
BLOOD IN STOOL: 0
COUGH: 0
VOMITING: 0
APNEA: 0

## 2020-04-03 NOTE — PROGRESS NOTES
breath. Cardiovascular: Negative for chest pain, palpitations and leg swelling. Gastrointestinal: Negative for abdominal pain, blood in stool, constipation, diarrhea, nausea and vomiting. Musculoskeletal: Negative for arthralgias. Neurological: Negative for seizures and headaches. Psychiatric/Behavioral: Positive for decreased concentration, dysphoric mood and sleep disturbance. Negative for agitation, behavioral problems, confusion, hallucinations, self-injury and suicidal ideas. The patient is nervous/anxious. The patient is not hyperactive. Prior to Visit Medications    Medication Sig Taking? Authorizing Provider   clonazePAM (KLONOPIN) 0.5 MG tablet Take 0.5 tablets by mouth 2 times daily as needed for Anxiety for up to 16 days.  Yes Lisa Singh MD   ondansetron (ZOFRAN ODT) 8 MG TBDP disintegrating tablet Place 1 tablet under the tongue every 8 hours as needed for Nausea or Vomiting  GIULIANA Killian   escitalopram (LEXAPRO) 5 MG tablet Take 1 tablet by mouth daily  Lisa Singh MD   tamsulosin (FLOMAX) 0.4 MG capsule Take 0.4 mg by mouth daily  Historical Provider, MD   Biotin w/ Vitamins C & E (HAIR SKIN & NAILS GUMMIES PO) Take by mouth  Historical Provider, MD   Multiple Vitamins-Minerals (MULTIVITAMIN GUMMIES ADULT PO) Take by mouth  Historical Provider, MD   Levonorgest-Eth Estrad 91-Day 0.1-0.02 & 0.01 MG TABS Take by mouth  Historical Provider, MD       Social History     Tobacco Use    Smoking status: Never Smoker    Smokeless tobacco: Never Used   Substance Use Topics    Alcohol use: No    Drug use: No        No Known Allergies,   Past Medical History:   Diagnosis Date    Concussion     Leg pain     Ovarian cyst rupture     UTI (urinary tract infection)    ,   Past Surgical History:   Procedure Laterality Date    MOUTH SURGERY      wisdom teeth   ,   Social History     Tobacco Use    Smoking status: Never Smoker    Smokeless tobacco: Never Used   Substance Use Topics  Alcohol use: No    Drug use: No   , No family history on file. PHYSICAL EXAMINATION:    [x] Alert  [x] Oriented to person/place/time    [x] No apparent distress     [] Sclera clear     [x] Breathing appears normal  \        [x] Cranial Nerves II-XII grossly intact    [x] Motor grossly intact in visible upper extremities    [x] Motor grossly intact in visible lower extremities    [x] Normal Mood  [] OTHER:    Patient appeared at her baseline with good eye contact and was smiling and laughing during her appointment. Speech was at a normal rate affect and mood appeared normal.   Due to this being a TeleHealth encounter, evaluation of the following organ systems is limited: Vitals/Constitutional/EENT/Resp/CV/GI//MS/Neuro/Skin/Heme-Lymph-Imm. ASSESSMENT/PLAN:  1. Anxiety  Treat  Anxiety as it appears to be the most worrisome symptom per patient. Goal will be to find an appropriate mood stabilizer and confirm diagnosis and wean off of benzodiazepines  - clonazePAM (KLONOPIN) 0.5 MG tablet; Take 0.5 tablets by mouth 2 times daily as needed for Anxiety for up to 16 days. Dispense: 8 tablet; Refill: 0    2. Mood disorder ([facility] [address]  Concern for bipolar disorder per interview and reviewing of MDQ  Will wean off of Lexapro slowly as it may be exacerbating symptoms. Start with half dose at 2.5 mg for first week  - Ambulatory referral to Psychiatry      Return in about 1 week (around 4/10/2020). An  electronic signature was used to authenticate this note.    --Nathan Villagomez MD on 4/3/2020 at 4:15 PM        Pursuant to the emergency declaration under the [facility], [address] [place] [address] authority and the [employer] Act, this Virtual  Visit was conducted, with patient's consent, to reduce the patient's risk of exposure to COVID-19 and provide continuity of care for an established patient.     Services were provided through a video synchronous discussion virtually to substitute for in-person clinic visit.

## 2020-04-07 ENCOUNTER — TELEPHONE (OUTPATIENT)
Dept: FAMILY MEDICINE CLINIC | Age: 20
End: 2020-04-07

## 2020-04-07 ENCOUNTER — VIRTUAL VISIT (OUTPATIENT)
Dept: FAMILY MEDICINE CLINIC | Age: 20
End: 2020-04-07
Payer: COMMERCIAL

## 2020-04-07 PROCEDURE — 99213 OFFICE O/P EST LOW 20 MIN: CPT | Performed by: FAMILY MEDICINE

## 2020-04-07 ASSESSMENT — ENCOUNTER SYMPTOMS
DIARRHEA: 0
SHORTNESS OF BREATH: 0
NAUSEA: 0
APNEA: 0
BLOOD IN STOOL: 0
ABDOMINAL PAIN: 0
CONSTIPATION: 0
VOMITING: 0
CHEST TIGHTNESS: 0
COUGH: 0

## 2020-04-07 NOTE — PROGRESS NOTES
palpitations and leg swelling. Gastrointestinal: Negative for abdominal pain, blood in stool, constipation, diarrhea, nausea and vomiting. Musculoskeletal: Negative for arthralgias. Neurological: Negative for seizures and headaches. Psychiatric/Behavioral: Positive for dysphoric mood. Negative for agitation, behavioral problems, confusion, decreased concentration, hallucinations, self-injury, sleep disturbance and suicidal ideas. The patient is hyperactive. The patient is not nervous/anxious. Prior to Visit Medications    Medication Sig Taking? Authorizing Provider   clonazePAM (KLONOPIN) 0.5 MG tablet Take 0.5 tablets by mouth 2 times daily as needed for Anxiety for up to 16 days.   Clara Senior MD   ondansetron (ZOFRAN ODT) 8 MG TBDP disintegrating tablet Place 1 tablet under the tongue every 8 hours as needed for Nausea or Vomiting  GIULIANA Manning   escitalopram (LEXAPRO) 5 MG tablet Take 1 tablet by mouth daily  Clara Senior MD   tamsulosin (FLOMAX) 0.4 MG capsule Take 0.4 mg by mouth daily  Historical Provider, MD   Biotin w/ Vitamins C & E (HAIR SKIN & NAILS GUMMIES PO) Take by mouth  Historical Provider, MD   Multiple Vitamins-Minerals (MULTIVITAMIN GUMMIES ADULT PO) Take by mouth  Historical Provider, MD   Levonorgest-Eth Estrad 91-Day 0.1-0.02 & 0.01 MG TABS Take by mouth  Historical Provider, MD       Social History     Tobacco Use    Smoking status: Never Smoker    Smokeless tobacco: Never Used   Substance Use Topics    Alcohol use: No    Drug use: No        No Known Allergies,   Past Medical History:   Diagnosis Date    Concussion     Leg pain     Ovarian cyst rupture     UTI (urinary tract infection)    ,   Past Surgical History:   Procedure Laterality Date    MOUTH SURGERY      wisdom teeth       PHYSICAL EXAMINATION:  [ INSTRUCTIONS:  \"[x]\" Indicates a positive item  \"[]\" Indicates a negative item  -- DELETE ALL ITEMS NOT EXAMINED]  [x] Alert  [x] Oriented to

## 2020-04-13 ENCOUNTER — VIRTUAL VISIT (OUTPATIENT)
Dept: BEHAVIORAL/MENTAL HEALTH CLINIC | Age: 20
End: 2020-04-13
Payer: COMMERCIAL

## 2020-04-13 PROCEDURE — 90792 PSYCH DIAG EVAL W/MED SRVCS: CPT | Performed by: PSYCHIATRY & NEUROLOGY

## 2020-04-13 RX ORDER — LAMOTRIGINE 25 MG/1
25 TABLET ORAL 2 TIMES DAILY
Qty: 60 TABLET | Refills: 2 | Status: SHIPPED | OUTPATIENT
Start: 2020-04-13 | End: 2020-08-04

## 2020-04-13 RX ORDER — PROPRANOLOL HYDROCHLORIDE 10 MG/1
10 TABLET ORAL 3 TIMES DAILY PRN
Qty: 90 TABLET | Refills: 3 | Status: SHIPPED | OUTPATIENT
Start: 2020-04-13 | End: 2021-01-29 | Stop reason: SDUPTHER

## 2020-04-22 ENCOUNTER — HOSPITAL ENCOUNTER (EMERGENCY)
Age: 20
Discharge: HOME | End: 2020-04-22
Attending: EMERGENCY MEDICINE
Payer: COMMERCIAL

## 2020-04-22 ENCOUNTER — APPOINTMENT (OUTPATIENT)
Dept: CT IMAGING | Age: 20
End: 2020-04-22
Payer: COMMERCIAL

## 2020-04-22 VITALS
BODY MASS INDEX: 28.35 KG/M2 | DIASTOLIC BLOOD PRESSURE: 85 MMHG | HEART RATE: 93 BPM | OXYGEN SATURATION: 99 % | HEIGHT: 63 IN | WEIGHT: 160 LBS | RESPIRATION RATE: 16 BRPM | TEMPERATURE: 98.1 F | SYSTOLIC BLOOD PRESSURE: 110 MMHG

## 2020-04-22 LAB
ALBUMIN SERPL-MCNC: 4.4 G/DL (ref 3.5–4.6)
ALP BLD-CCNC: 82 U/L (ref 40–130)
ALT SERPL-CCNC: 19 U/L (ref 0–33)
AMYLASE: 77 U/L (ref 22–93)
ANION GAP SERPL CALCULATED.3IONS-SCNC: 14 MEQ/L (ref 9–15)
AST SERPL-CCNC: 18 U/L (ref 0–35)
BACTERIA: ABNORMAL /HPF
BASOPHILS ABSOLUTE: 0 K/UL (ref 0–0.2)
BASOPHILS RELATIVE PERCENT: 0.4 %
BILIRUB SERPL-MCNC: <0.2 MG/DL (ref 0.2–0.7)
BUN BLDV-MCNC: 9 MG/DL (ref 6–20)
CALCIUM SERPL-MCNC: 9.2 MG/DL (ref 8.5–9.9)
CHLORIDE BLD-SCNC: 104 MEQ/L (ref 95–107)
CO2: 21 MEQ/L (ref 20–31)
CREAT SERPL-MCNC: 0.69 MG/DL (ref 0.5–0.9)
EOSINOPHILS ABSOLUTE: 0.2 K/UL (ref 0–0.7)
EOSINOPHILS RELATIVE PERCENT: 1.9 %
EPITHELIAL CELLS, UA: ABNORMAL /HPF
GFR AFRICAN AMERICAN: >60
GFR NON-AFRICAN AMERICAN: >60
GLOBULIN: 3.3 G/DL (ref 2.3–3.5)
GLUCOSE BLD-MCNC: 103 MG/DL (ref 70–99)
HCG(URINE) PREGNANCY TEST: NEGATIVE
HCT VFR BLD CALC: 40 % (ref 37–47)
HEMOGLOBIN: 13.6 G/DL (ref 12–16)
LIPASE: 32 U/L (ref 12–95)
LYMPHOCYTES ABSOLUTE: 2.9 K/UL (ref 1–4.8)
LYMPHOCYTES RELATIVE PERCENT: 27.8 %
MCH RBC QN AUTO: 28.3 PG (ref 27–31.3)
MCHC RBC AUTO-ENTMCNC: 33.9 % (ref 33–37)
MCV RBC AUTO: 83.5 FL (ref 82–100)
MONOCYTES ABSOLUTE: 0.7 K/UL (ref 0.2–0.8)
MONOCYTES RELATIVE PERCENT: 7 %
MUCUS: PRESENT /LPF
NEUTROPHILS ABSOLUTE: 6.5 K/UL (ref 1.4–6.5)
NEUTROPHILS RELATIVE PERCENT: 62.9 %
PDW BLD-RTO: 13.2 % (ref 11.5–14.5)
PLATELET # BLD: 361 K/UL (ref 130–400)
POTASSIUM SERPL-SCNC: 3.8 MEQ/L (ref 3.4–4.9)
RBC # BLD: 4.79 M/UL (ref 4.2–5.4)
RBC UA: ABNORMAL /HPF (ref 0–2)
SODIUM BLD-SCNC: 139 MEQ/L (ref 135–144)
TOTAL PROTEIN: 7.7 G/DL (ref 6.3–8)
WBC # BLD: 10.4 K/UL (ref 4.5–11)
WBC UA: ABNORMAL /HPF (ref 0–5)

## 2020-04-22 PROCEDURE — 2580000003 HC RX 258: Performed by: EMERGENCY MEDICINE

## 2020-04-22 PROCEDURE — 74176 CT ABD & PELVIS W/O CONTRAST: CPT

## 2020-04-22 PROCEDURE — 84703 CHORIONIC GONADOTROPIN ASSAY: CPT

## 2020-04-22 PROCEDURE — 81001 URINALYSIS AUTO W/SCOPE: CPT

## 2020-04-22 PROCEDURE — 80053 COMPREHEN METABOLIC PANEL: CPT

## 2020-04-22 PROCEDURE — 99284 EMERGENCY DEPT VISIT MOD MDM: CPT

## 2020-04-22 PROCEDURE — 82150 ASSAY OF AMYLASE: CPT

## 2020-04-22 PROCEDURE — 83690 ASSAY OF LIPASE: CPT

## 2020-04-22 PROCEDURE — 96375 TX/PRO/DX INJ NEW DRUG ADDON: CPT

## 2020-04-22 PROCEDURE — 6360000002 HC RX W HCPCS: Performed by: EMERGENCY MEDICINE

## 2020-04-22 PROCEDURE — 6370000000 HC RX 637 (ALT 250 FOR IP): Performed by: EMERGENCY MEDICINE

## 2020-04-22 PROCEDURE — 87086 URINE CULTURE/COLONY COUNT: CPT

## 2020-04-22 PROCEDURE — 96374 THER/PROPH/DIAG INJ IV PUSH: CPT

## 2020-04-22 PROCEDURE — 85025 COMPLETE CBC W/AUTO DIFF WBC: CPT

## 2020-04-22 RX ORDER — 0.9 % SODIUM CHLORIDE 0.9 %
1000 INTRAVENOUS SOLUTION INTRAVENOUS ONCE
Status: COMPLETED | OUTPATIENT
Start: 2020-04-22 | End: 2020-04-22

## 2020-04-22 RX ORDER — ACETAMINOPHEN 500 MG
1000 TABLET ORAL ONCE
Status: COMPLETED | OUTPATIENT
Start: 2020-04-22 | End: 2020-04-22

## 2020-04-22 RX ORDER — KETOROLAC TROMETHAMINE 30 MG/ML
30 INJECTION, SOLUTION INTRAMUSCULAR; INTRAVENOUS ONCE
Status: COMPLETED | OUTPATIENT
Start: 2020-04-22 | End: 2020-04-22

## 2020-04-22 RX ORDER — ONDANSETRON 4 MG/1
4 TABLET, ORALLY DISINTEGRATING ORAL EVERY 8 HOURS PRN
Qty: 10 TABLET | Refills: 0 | Status: SHIPPED | OUTPATIENT
Start: 2020-04-22 | End: 2022-05-16

## 2020-04-22 RX ORDER — TRAMADOL HYDROCHLORIDE 50 MG/1
50 TABLET ORAL EVERY 8 HOURS PRN
Qty: 15 TABLET | Refills: 0 | Status: SHIPPED | OUTPATIENT
Start: 2020-04-22 | End: 2020-04-27

## 2020-04-22 RX ORDER — ONDANSETRON 2 MG/ML
4 INJECTION INTRAMUSCULAR; INTRAVENOUS ONCE
Status: COMPLETED | OUTPATIENT
Start: 2020-04-22 | End: 2020-04-22

## 2020-04-22 RX ADMIN — KETOROLAC TROMETHAMINE 30 MG: 30 INJECTION, SOLUTION INTRAMUSCULAR at 16:53

## 2020-04-22 RX ADMIN — ONDANSETRON 4 MG: 2 INJECTION INTRAMUSCULAR; INTRAVENOUS at 16:52

## 2020-04-22 RX ADMIN — ACETAMINOPHEN 1000 MG: 500 TABLET ORAL at 16:59

## 2020-04-22 RX ADMIN — SODIUM CHLORIDE 1000 ML: 9 INJECTION, SOLUTION INTRAVENOUS at 16:52

## 2020-04-22 ASSESSMENT — PAIN DESCRIPTION - ORIENTATION
ORIENTATION: LEFT
ORIENTATION: LEFT

## 2020-04-22 ASSESSMENT — PAIN SCALES - GENERAL
PAINLEVEL_OUTOF10: 4
PAINLEVEL_OUTOF10: 4
PAINLEVEL_OUTOF10: 6

## 2020-04-22 ASSESSMENT — ENCOUNTER SYMPTOMS
TROUBLE SWALLOWING: 0
COUGH: 0
BACK PAIN: 0
STRIDOR: 0
DIARRHEA: 0
FACIAL SWELLING: 0
EYE PAIN: 0
CONSTIPATION: 0
WHEEZING: 0
VOICE CHANGE: 0
ABDOMINAL PAIN: 1
EYE REDNESS: 0
NAUSEA: 1
EYE DISCHARGE: 0
VOMITING: 0
CHOKING: 0
SINUS PRESSURE: 0
SORE THROAT: 0
BLOOD IN STOOL: 0
SHORTNESS OF BREATH: 0
CHEST TIGHTNESS: 0

## 2020-04-22 ASSESSMENT — PAIN DESCRIPTION - PROGRESSION
CLINICAL_PROGRESSION: GRADUALLY IMPROVING
CLINICAL_PROGRESSION: GRADUALLY IMPROVING

## 2020-04-22 ASSESSMENT — PAIN DESCRIPTION - PAIN TYPE
TYPE: ACUTE PAIN
TYPE: ACUTE PAIN

## 2020-04-22 ASSESSMENT — PAIN DESCRIPTION - ONSET
ONSET: ON-GOING
ONSET: ON-GOING

## 2020-04-22 ASSESSMENT — PAIN DESCRIPTION - DESCRIPTORS: DESCRIPTORS: ACHING

## 2020-04-22 ASSESSMENT — PAIN DESCRIPTION - LOCATION
LOCATION: FLANK
LOCATION: FLANK

## 2020-04-22 NOTE — ED PROVIDER NOTES
neck stiffness. Skin: Negative for pallor and rash. Neurological: Negative for tremors, seizures, syncope, weakness, numbness and headaches. Hematological: Negative for adenopathy. Does not bruise/bleed easily. Psychiatric/Behavioral: Negative for agitation, behavioral problems, hallucinations and sleep disturbance. The patient is not hyperactive. All other systems reviewed and are negative. Except as noted above the remainder of the review of systems was reviewed and negative. PAST MEDICAL HISTORY     Past Medical History:   Diagnosis Date    Concussion     Leg pain     Ovarian cyst rupture     UTI (urinary tract infection)          SURGICALHISTORY       Past Surgical History:   Procedure Laterality Date    MOUTH SURGERY      wisdom teeth         CURRENT MEDICATIONS       Previous Medications    BIOTIN W/ VITAMINS C & E (HAIR SKIN & NAILS GUMMIES PO)    Take by mouth    CLONAZEPAM (KLONOPIN) 0.5 MG TABLET    Take 0.5 tablets by mouth 2 times daily as needed for Anxiety for up to 16 days. ESCITALOPRAM (LEXAPRO) 5 MG TABLET    Take 1 tablet by mouth daily    LAMOTRIGINE (LAMICTAL) 25 MG TABLET    Take 1 tablet by mouth 2 times daily For the first week, take 1 tab QD, then if no rash, can increase to 1 tab BID after that    LEVONORGEST-ETH ESTRAD 91-DAY 0.1-0.02 & 0.01 MG TABS    Take by mouth    MULTIPLE VITAMINS-MINERALS (MULTIVITAMIN GUMMIES ADULT PO)    Take by mouth    ONDANSETRON (ZOFRAN ODT) 8 MG TBDP DISINTEGRATING TABLET    Place 1 tablet under the tongue every 8 hours as needed for Nausea or Vomiting    PROPRANOLOL (INDERAL) 10 MG TABLET    Take 1 tablet by mouth 3 times daily as needed (anxiety)    TAMSULOSIN (FLOMAX) 0.4 MG CAPSULE    Take 0.4 mg by mouth daily       ALLERGIES     Patient has no known allergies. FAMILY HISTORY     History reviewed. No pertinent family history.        SOCIAL HISTORY       Social History     Socioeconomic History    Marital status: Single regular rhythm. Pulses: Normal pulses. Heart sounds: Normal heart sounds. No murmur. No gallop. Pulmonary:      Effort: No respiratory distress. Breath sounds: Normal breath sounds. No wheezing. Abdominal:      General: Bowel sounds are normal. There is no distension. Palpations: Abdomen is soft. There is no mass. Tenderness: There is abdominal tenderness. There is no right CVA tenderness, guarding or rebound. Musculoskeletal: Normal range of motion. General: No tenderness. Skin:     General: Skin is warm. Findings: No erythema or rash. Neurological:      Mental Status: She is alert and oriented to person, place, and time. Cranial Nerves: No cranial nerve deficit. Motor: No abnormal muscle tone. Psychiatric:         Behavior: Behavior normal.         Thought Content: Thought content normal.         DIAGNOSTIC RESULTS     EKG: All EKG's are interpreted by the Emergency Department Physician who either signs or Co-signsthis chart in the absence of a cardiologist.        RADIOLOGY:   Derrel Rule such as CT, Ultrasound and MRI are read by the radiologist. Plain radiographic images are visualized and preliminarily interpreted by the emergency physician with the below findings:        Interpretation per the Radiologist below, if available at the time ofthis note:    CT ABDOMEN PELVIS WO CONTRAST Additional Contrast? None   Final Result      Nonobstructing right renal calculus. No left renal calculi. No hydronephrosis. No hydroureter/ureteral calculi. All CT scans at this facility use dose modulation, iterative reconstruction, and/or weight based dosing when appropriate to reduce radiation dose to as low as reasonably achievable.                      ED BEDSIDE ULTRASOUND:   Performed by ED Physician - none    LABS:  Labs Reviewed   COMPREHENSIVE METABOLIC PANEL - Abnormal; Notable for the following components:       Result Value    Glucose 103 (*)     All other components within normal limits   MICROSCOPIC URINALYSIS - Abnormal; Notable for the following components:    Bacteria, UA FEW (*)     All other components within normal limits   CULTURE, URINE   URINE RT REFLEX TO CULTURE   PREGNANCY, URINE   CBC WITH AUTO DIFFERENTIAL   LIPASE   AMYLASE       All other labs were within normal range or not returned as of this dictation. EMERGENCY DEPARTMENT COURSE and DIFFERENTIAL DIAGNOSIS/MDM:   Vitals:    Vitals:    04/22/20 1635   BP: (!) 145/85   Pulse: 115   Resp: 16   Temp: 99.6 °F (37.6 °C)   TempSrc: Oral   SpO2: 98%   Weight: 160 lb (72.6 kg)   Height: 5' 3\" (1.6 m)       MDM  Number of Diagnoses or Management Options  Diagnosis management comments: Is having pain to the left flank area noted to be low-grade fever sick at home no vomiting no diarrhea. of breath no chest pain urinalysis sent for culture but essentially negative blood test satisfactory CAT scan performed which is essentially negative for anything acute pathology patient is told about low-grade fever drinking fluids close follow-up in 2 days time or maybe video conference with your doctors for close follow-up on Friday in case you get worse patient understood very well. Patient educated about their test results which include potential coronavirus. Given that the patient has fever/chills, cough, n/v, myalgias, sob coronavirus was listed as one of her potential etiologies. Patient informed that the current [facility] Board recommendations are that if you symptoms are mild to go home and self quarantine for 2 weeks. Patient understands this and is in agreement of this plan. Patient given prescription for , given infection warning signs and will go home and self quarantine. Follow up with pcp. CRITICAL CARE TIME   Total Critical Care time was  minutes, excluding separately reportableprocedures.   There was a high probability of clinicallysignificant/life threatening deterioration in the patient's condition which required my urgent intervention. CONSULTS:  None    PROCEDURES:  Unless otherwise noted below, none     Procedures    FINAL IMPRESSION      1. Flank pain    2. Acute febrile illness    3. Nausea          DISPOSITION/PLAN   DISPOSITION        PATIENT REFERRED TO:  [facility] Myrna Lagos MD  [facility] [id number] [place] [address] Road  [phone]    In 2 days  If symptoms worsen, may do a video conference on Friday in case you get worse fever control hydration is important      DISCHARGE MEDICATIONS:  New Prescriptions    ONDANSETRON (ZOFRAN ODT) 4 MG DISINTEGRATING TABLET    Take 1 tablet by mouth every 8 hours as needed for Nausea    TRAMADOL (ULTRAM) 50 MG TABLET    Take 1 tablet by mouth every 8 hours as needed for Pain for up to 5 days.           (Please note that portions of this note were completed with a voice recognition program.  Efforts were made to edit the dictations but occasionally words are mis-transcribed.)    Gopal Blankenship MD (electronically signed)  Attending Emergency Physician       Gopal Blankenship MD  04/22/20 [address] [place] [address], MD  04/22/20 1317

## 2020-04-24 LAB — URINE CULTURE, ROUTINE: NORMAL

## 2020-04-28 ENCOUNTER — VIRTUAL VISIT (OUTPATIENT)
Dept: BEHAVIORAL/MENTAL HEALTH CLINIC | Age: 20
End: 2020-04-28
Payer: COMMERCIAL

## 2020-04-28 PROBLEM — F31.81 BIPOLAR 2 DISORDER (HCC): Status: ACTIVE | Noted: 2020-04-28

## 2020-04-28 PROCEDURE — 99214 OFFICE O/P EST MOD 30 MIN: CPT | Performed by: PSYCHIATRY & NEUROLOGY

## 2020-04-28 PROCEDURE — G8428 CUR MEDS NOT DOCUMENT: HCPCS | Performed by: PSYCHIATRY & NEUROLOGY

## 2020-04-28 NOTE — PROGRESS NOTES
examiner:  attentive and good eye contact  Speech:  spontaneous, normal rate and normal volume   Mood: \"better\", appears euthymic  Affect:  mood congruent  Thought processes:  linear and logical  Thought content:  Denies suicidal or homicidal ideation, denies auditory or visual hallucinations  Cognition:  no deficits in attention, concentration notable, recent memory grossly intact  Concentration intact  Memory intact  Insight good   Judgement fair   Fund of Knowledge adequate    Assessment:     Patient symptoms are:   [] Well controlled  [x] Improving  [] Worsening  [] No change    Pt is brianna for safety and denies thoughts of SI/HI. Amenable to plan. No acute concerns to address regarding medications. Medical Diagnoses:  Patient Active Problem List   Diagnosis    Excessive or frequent menstruation    Migraine with aura and without status migrainosus, not intractable    Acute intractable headache    Astigmatism    Idiopathic peripheral neuropathy    Muscle ache of extremity    Regular astigmatism, bilateral    Vitreous floaters of both eyes    Bipolar 2 disorder ([facility] [address]     Psychiatric Diagnoses:  1. Bipolar 2 disorder ([facility] [address]        Plan:  1. Start/Continue Lamictal 1 tab q day, pt is attempting a more slow taper 2/2 side effects  2. Continue propranolol 10 mg tid prn   3. Pt says she would switch to 2 tabs QPM once she increases propranolol   4. No Labs ordered today   5. Crisis plan reviewed and patient verbally contracts for safety. Go to ED with emergent symptoms or safety concerns. 6. Risks, benefits, side effects of medications, including any / all black box warnings, discussed with patient, who verbalizes their understanding. Patient denies any thoughts of harm to self and denies any acute safety concerns remains appropriate for outpatient level of care. Will continue to reassess with each appointment. Treatment Plan:  Reviewed current Medications with the patient.  Education for an established patient. Services were provided through a video synchronous discussion virtually to substitute for in-person clinic visit.

## 2020-05-12 ENCOUNTER — TELEPHONE (OUTPATIENT)
Dept: FAMILY MEDICINE CLINIC | Age: 20
End: 2020-05-12

## 2020-05-12 NOTE — TELEPHONE ENCOUNTER
lamoTRIgine (LAMICTAL) 25 MG tablet [[id number]]     Order Details   Dose: 25 mg Route: Oral Frequency: 2 TIMES DAILY   Dispense Quantity: 60 tablet Refills: 2 Fills remaining: --           Sig: Take 1 tablet by mouth 2 times daily For the first week, take 1 tab QD, then if no rash, can increase to 1 tab BID after that        Dr Karen Tucker,    Patient c/o that when she was taking  above Rx in the AM it mad her sleepy. Then when she switched to taking it in the PM it gives her insomnia. Please advise.

## 2020-05-19 ENCOUNTER — VIRTUAL VISIT (OUTPATIENT)
Dept: BEHAVIORAL/MENTAL HEALTH CLINIC | Age: 20
End: 2020-05-19
Payer: COMMERCIAL

## 2020-05-19 PROCEDURE — 99214 OFFICE O/P EST MOD 30 MIN: CPT | Performed by: PSYCHIATRY & NEUROLOGY

## 2020-05-19 PROCEDURE — G8428 CUR MEDS NOT DOCUMENT: HCPCS | Performed by: PSYCHIATRY & NEUROLOGY

## 2020-05-19 NOTE — PROGRESS NOTES
or homicidal ideation, denies auditory or visual hallucinations  Cognition:  no deficits in attention, concentration notable, recent memory grossly intact  Concentration intact  Memory intact  Insight good   Judgement fair   Fund of Knowledge adequate    Assessment:     Patient symptoms are:   [] Well controlled  [x] Improving  [] Worsening  [] No change    Pt is brianna for safety and denies thoughts of SI/HI. Amenable to plan. No acute concerns to address regarding medications. Medical Diagnoses:  Patient Active Problem List   Diagnosis    Excessive or frequent menstruation    Migraine with aura and without status migrainosus, not intractable    Acute intractable headache    Astigmatism    Idiopathic peripheral neuropathy    Muscle ache of extremity    Regular astigmatism, bilateral    Vitreous floaters of both eyes    Bipolar 2 disorder ([facility] [address]     Psychiatric Diagnoses:  1. Bipolar 2 disorder ([facility] [address]        Plan:    1. Start/Continue Lamictal 25 mg BID   2. Melatonin 2 mg QD   3. Propranolol 10 mg TID PRN anxiety  4. No Labs ordered today  5. Crisis plan reviewed and patient verbally contracts for safety. Go to ED with emergent symptoms or safety concerns. 6. Risks, benefits, side effects of medications, including any / all black box warnings, discussed with patient, who verbalizes their understanding. Patient denies any thoughts of harm to self and denies any acute safety concerns remains appropriate for outpatient level of care. Will continue to reassess with each appointment. Treatment Plan:  Reviewed current Medications with the patient. Education provided on the compliance with treatment.    Reviewed OARRs, no concerns identified    The anticipated benefits and side effects of the medications, including the anticipated results of not receiving the medication, and of alternatives to the medications were explained to the patient and their informed consent was obtained for starting

## 2020-06-28 LAB
BILIRUBIN URINE: NORMAL
BLOOD, URINE: NEGATIVE
CLARITY: CLEAR
COLOR: YELLOW
GLUCOSE URINE: NEGATIVE MG/DL
KETONES, URINE: NEGATIVE MG/DL
LEUKOCYTE ESTERASE, URINE: NORMAL
NITRITE, URINE: NEGATIVE
PH UA: 6 (ref 5–9)
PROTEIN UA: NEGATIVE MG/DL
SPECIFIC GRAVITY UA: >=1.03 (ref 1–1.03)
URINE REFLEX TO CULTURE: NORMAL
UROBILINOGEN, URINE: 0.2 E.U./DL

## 2021-01-19 RX ORDER — LAMOTRIGINE 25 MG/1
25 TABLET ORAL 2 TIMES DAILY
Qty: 60 TABLET | Refills: 0 | Status: SHIPPED | OUTPATIENT
Start: 2021-01-19 | End: 2021-01-29 | Stop reason: SDUPTHER

## 2021-01-19 RX ORDER — LAMOTRIGINE 25 MG/1
25 TABLET ORAL 2 TIMES DAILY
Qty: 60 TABLET | Refills: 0 | Status: CANCELLED | OUTPATIENT
Start: 2021-01-19

## 2021-01-29 ENCOUNTER — VIRTUAL VISIT (OUTPATIENT)
Dept: BEHAVIORAL/MENTAL HEALTH CLINIC | Age: 21
End: 2021-01-29
Payer: COMMERCIAL

## 2021-01-29 DIAGNOSIS — F31.81 BIPOLAR 2 DISORDER (HCC): Primary | ICD-10-CM

## 2021-01-29 PROCEDURE — G8428 CUR MEDS NOT DOCUMENT: HCPCS | Performed by: PSYCHIATRY & NEUROLOGY

## 2021-01-29 PROCEDURE — 99214 OFFICE O/P EST MOD 30 MIN: CPT | Performed by: PSYCHIATRY & NEUROLOGY

## 2021-01-29 RX ORDER — LAMOTRIGINE 100 MG/1
50 TABLET ORAL 2 TIMES DAILY
Qty: 30 TABLET | Refills: 3 | Status: SHIPPED | OUTPATIENT
Start: 2021-01-29 | End: 2021-06-08

## 2021-01-29 RX ORDER — PROPRANOLOL HYDROCHLORIDE 10 MG/1
10 TABLET ORAL 3 TIMES DAILY PRN
Qty: 90 TABLET | Refills: 3 | Status: SHIPPED | OUTPATIENT
Start: 2021-01-29 | End: 2021-02-25 | Stop reason: SDUPTHER

## 2021-01-29 NOTE — PROGRESS NOTES
1/29/2021    TELEHEALTH PSYCHIATRY FOLLOWUP -- Audio/Visual (During TKRIC-92 public health emergency)      Psychiatric Diagnoses:  1. Bipolar 2 disorder (HCC)          Due to COVID 19 outbreak, patient's office visit was converted to a virtual visit. Patient was contacted and agreed to proceed with a virtual visit via DOXY  30 minutes with direct communication with patient for encounter The risks and benefits of converting to a virtual visit were discussed in light of the current infectious disease epidemic. Patient also understood that insurance coverage and co-pays are up to their individual insurance plans. Patient Location:        Patient's home address  Provider Location (City/State):        [address]    Assessment/Plan:       Patient tolerating current medication regimen but will proceed with changes below in order to address continued mood symptoms. Medication has been helpful so far and patient has had no side effects. Patient is less depressed, less symptoms of hypomania, and still motivated to complete work. Less  anxiety throughout the day, however able to attend to ADLs. Continue to encourage physical activity and adherence to medication regimen. No acute concerns voiced. Medical Diagnoses:  Patient Active Problem List   Diagnosis    Excessive or frequent menstruation    Migraine with aura and without status migrainosus, not intractable    Acute intractable headache    Astigmatism    Idiopathic peripheral neuropathy    Muscle ache of extremity    Regular astigmatism, bilateral    Vitreous floaters of both eyes    Bipolar 2 disorder (HCC)           DATE and changes made  ? 5/19/20  o Propranolol 10 mg TID PRN anxiety  o Lamictal 25 mg BID   o Melatonin 2 mg QHS   o Patient says she has talked to [facility] [address] and will call next week to schedule her follow up   ? 1/29/21  o Propranolol 10 mg TID PRN anxiety  o Lamictal 25 mg BID   ?  INCREASE LAMICTAL to 50 mg BID   o Melatonin 2 mg QHS At today's visit, pt reports that since our last visit, their average Energy has been     [] Poor    [x] Average  [] Increased         Aggression:  [] yes  [x] no    Patient is [x] Able to contract for safety  [] unable to CONTRACT FOR SAFETY     ROS:  [x] All negative/unchanged except if checked. Explain positive(checked items) below:       [] Constitutional  [] Eyes  [] Ear/Nose/Mouth/Throat  [] Respiratory  [] CV  [] GI  []   [] Musculoskeletal  [] Skin/Breast  [] Neurological  [] Endocrine  [] Heme/Lymph  [] Allergic/Immunologic      MEDICATIONS:    Current Outpatient Medications:     lamoTRIgine (LAMICTAL) 25 MG tablet, Take 1 tablet by mouth 2 times daily, Disp: 60 tablet, Rfl: 0    ondansetron (ZOFRAN ODT) 4 MG disintegrating tablet, Take 1 tablet by mouth every 8 hours as needed for Nausea, Disp: 10 tablet, Rfl: 0    propranolol (INDERAL) 10 MG tablet, Take 1 tablet by mouth 3 times daily as needed (anxiety), Disp: 90 tablet, Rfl: 3    tamsulosin (FLOMAX) 0.4 MG capsule, Take 0.4 mg by mouth daily, Disp: , Rfl:     Biotin w/ Vitamins C & E (HAIR SKIN & NAILS GUMMIES PO), Take by mouth, Disp: , Rfl:     Multiple Vitamins-Minerals (MULTIVITAMIN GUMMIES ADULT PO), Take by mouth, Disp: , Rfl:     Levonorgest-Eth Estrad 91-Day 0.1-0.02 & 0.01 MG TABS, Take by mouth, Disp: , Rfl:     Examination:    Vitals: not taken in person, most recent vitals in chart reviewed  There were no vitals filed for this visit. Wt Readings from Last 3 Encounters:   04/22/20 160 lb (72.6 kg)   03/02/20 195 lb 9.6 oz (88.7 kg)   04/25/19 169 lb (76.7 kg) (91 %, Z= 1.36)*     * Growth percentiles are based on CDC (Girls, 2-20 Years) data.        Labs:   no recent labs    Mental Status Examination:    Level of consciousness:  alert and oriented to person, place, and situation  Appearance:  well-appearing good grooming and good hygiene  Behavior/Motor:  no abnormalities noted Attitude toward examiner: friendly, pleasant and cooperative attentive and good eye contact  Speech:  spontaneous, normal rate and normal volume   Mood: \"more stable, good\"  Affect:  mood congruent  Thought processes:  linear and logical  Thought content:  Denies suicidal or homicidal ideation, denies auditory or visual hallucinations  Cognition:  no deficits in attention, concentration notable, recent memory grossly intact  Concentration intact  Memory intact  Insight good   Judgement fair   Fund of Knowledge adequate    Treatment Plan:  Reviewed current Medications with the patient. Education provided on the compliance with treatment. Reviewed OARRs, no concerns identified     The anticipated benefits and side effects of the medications, including the anticipated results of not receiving the medication, and of alternatives to the medications were explained to the patient and their informed consent was obtained for starting medications as well as adjusting the doses (titration or tapering) as indicated. The above information was given by physician in verbal form and sufficient understanding was in evidence. The patient participated in discussion of the information and question and/or concerns were addressed before the medication was given. PSYCHOTHERAPY/COUNSELING:  Encourage patient to attend outpatient appointments and therapy. [x] Therapeutic interview  [] Supportive  [x] CBT  [x] Ongoing  [] Other    No follow-ups on file. Follow-up in 2 weeks, patient informed to call for follow-up    Please note this report has been partially produced using speech recognition software  And may cause contain errors related to that system including grammar, punctuation and spelling as well as words and phrases that may seem inappropriate. If there are questions or concerns please feel free to contact me to clarify.     Fritz Urrutia MD  Electronically signed by Fritz Urrutia MD on 1/29/2021 at 9:10 AM 1/29/2021 9:10 AM    Psychiatry   Due to this being a TeleHealth encounter, evaluation of the following organ systems is limited: Vitals/Constitutional/EENT/Resp/CV/GI//MS/Neuro/Skin/Heme-Lymph-Imm. An  electronic signature was used to authenticate this note. --Norah Muro MD on 1/29/2021 at 9:10 AM    Pursuant to the emergency declaration under the [address] [place] [address], [facility] waiver authority and the [employer] and [employer] Act, this Virtual  Visit was conducted, with patient's consent, to reduce the patient's risk of exposure to COVID-19 and provide continuity of care for an established patient Services were provided through a video synchronous discussion virtually to substitute for in-person clinic visit.

## 2021-02-25 ENCOUNTER — VIRTUAL VISIT (OUTPATIENT)
Dept: BEHAVIORAL/MENTAL HEALTH CLINIC | Age: 21
End: 2021-02-25
Payer: COMMERCIAL

## 2021-02-25 DIAGNOSIS — F31.81 BIPOLAR 2 DISORDER (HCC): Primary | ICD-10-CM

## 2021-02-25 PROCEDURE — 99214 OFFICE O/P EST MOD 30 MIN: CPT | Performed by: PSYCHIATRY & NEUROLOGY

## 2021-02-25 PROCEDURE — G8428 CUR MEDS NOT DOCUMENT: HCPCS | Performed by: PSYCHIATRY & NEUROLOGY

## 2021-02-25 RX ORDER — PROPRANOLOL HYDROCHLORIDE 10 MG/1
10 TABLET ORAL 3 TIMES DAILY PRN
Qty: 180 TABLET | Refills: 3 | Status: SHIPPED | OUTPATIENT
Start: 2021-02-25 | End: 2021-03-11 | Stop reason: SDUPTHER

## 2021-02-25 RX ORDER — LAMOTRIGINE 100 MG/1
100 TABLET ORAL DAILY
Qty: 90 TABLET | Refills: 2 | Status: SHIPPED | OUTPATIENT
Start: 2021-02-25 | End: 2021-06-05 | Stop reason: SDUPTHER

## 2021-02-25 NOTE — PROGRESS NOTES
2/25/2021    TELEHEALTH PSYCHIATRY FOLLOWUP -- Audio/Visual (During ZNCWX-62 public health emergency)      Psychiatric Diagnoses:  1. Bipolar 2 disorder (HCC)          Due to COVID 19 outbreak, patient's office visit was converted to a virtual visit. Patient was contacted and agreed to proceed with a virtual visit via DOXY  30 minutes with direct communication with patient for encounter The risks and benefits of converting to a virtual visit were discussed in light of the current infectious disease epidemic. Patient also understood that insurance coverage and co-pays are up to their individual insurance plans. Patient Location:        Patient's home address  Provider Location (City/State):        [facility]        Assessment/Plan:   Patient doing well on current medication regimen. No changes. Mood improved, less depressed and more motivated. Less anxiety throughout the day, able to attend to ADLs. Continue to encourage physical activity and adherence to medication regimen. No acute concerns voiced. Patient feels mood has been stable, \"I feel like this time, I am stable without feeling blunted, I can still feel emotion\".           Medical Diagnoses:  Patient Active Problem List   Diagnosis    Excessive or frequent menstruation    Migraine with aura and without status migrainosus, not intractable    Acute intractable headache    Astigmatism    Idiopathic peripheral neuropathy    Muscle ache of extremity    Regular astigmatism, bilateral    Vitreous floaters of both eyes    Bipolar 2 disorder ([facility] [address]           DATE and changes made   2/25/21  o Grades are good   o Mood stable, managing multiple stressors  o No depressed mood   o Anxiety is well-controlled   o A little more energy at night than she thinks she needs, but no racing thoughts and is falling asleep consistently    o Notices slightly faster talking  - not wanting to increase lamictal due to concern this is causing weight gain   o Discussed this is more likely due to propranolol   o lamictal is likely suboptimal dosing, patient is aware she may be at increased risk of manic episode   o PROPRANOLOL 10 mg TID     AT TODAY'S VISIT     1. No Labs ordered today  2. Crisis plan reviewed and patient verbally contracts for safety. Go to ED with emergent symptoms or safety concerns. 3. Risks, benefits, side effects of medications, including any / all black box warnings, discussed with patient, who verbalizes their understanding. Pt is brianna for safety and denies thoughts of SI/HI. Amenable to plan. No acute concerns to address regarding medications. Subjective:    Taking more difficult classes this semester, is enjoying the material. Very interested in researching schizophrenia. Neuroscience at [employer]   Patient denies medication side effects apart from those mentioned in the assessment and plan. Patient reports they have been compliant with current medication regimen and have not missed a dose. At today's visit, patient denies thoughts of harm to self or others since last appointment, and denies auditory or visual hallucinations.      At today's visit, pt reports that since our last visit, their average MOOD has been (on a scale of 1-10, with 1 being severely depressed and 10 being not depressed at all          Very depressed [] 1  [] 2  [] 3  [] 4  [] 5  [] 6  [x] 7  [] 8  [] 9  [] 10  Not depressed    At today's visit, pt reports that since our last visit, their average ANXIETY has been (on a scale of 1-10, with 10 being severely anxious and 1 being not anxious at all)            Not anxious [] 1     [] 2     [] 3     [x] 4   [] 5    [] 6      [] 7   [] 8   [] 9       [] 10  Very anxious     At today's visit, pt reports that since our last visit, their average APPETITE has been  [] Decreased     [x] Normal/Unchanged   [] Increased      At today's visit, pt reports that since our last visit, their average HOURS OF SLEEP (every 24 hours) has been    [] 0-3   [] 4-5    [] 5-6    [] 6-7    [x] 8-9    [] 10-11   [] 11+    At today's visit, pt reports that since our last visit, their average Energy has been   [] Poor    [x] Average  [] Increased         Aggression:  [] yes  [x] no    Patient is [x] Able to contract for safety  [] unable to CONTRACT FOR SAFETY     ROS:  [x] All negative/unchanged except if checked. Explain positive(checked items) below:     Physically feeling well   [] Constitutional  [] Eyes  [] Ear/Nose/Mouth/Throat  [] Respiratory  [] CV  [] GI  []   [] Musculoskeletal  [] Skin/Breast  [] Neurological  [] Endocrine  [] Heme/Lymph  [] Allergic/Immunologic      MEDICATIONS:    Current Outpatient Medications:     lamoTRIgine (LAMICTAL) 100 MG tablet, Take 0.5 tablets by mouth 2 times daily, Disp: 30 tablet, Rfl: 3    propranolol (INDERAL) 10 MG tablet, Take 1 tablet by mouth 3 times daily as needed (anxiety), Disp: 90 tablet, Rfl: 3    ondansetron (ZOFRAN ODT) 4 MG disintegrating tablet, Take 1 tablet by mouth every 8 hours as needed for Nausea, Disp: 10 tablet, Rfl: 0    tamsulosin (FLOMAX) 0.4 MG capsule, Take 0.4 mg by mouth daily, Disp: , Rfl:     Biotin w/ Vitamins C & E (HAIR SKIN & NAILS GUMMIES PO), Take by mouth, Disp: , Rfl:     Multiple Vitamins-Minerals (MULTIVITAMIN GUMMIES ADULT PO), Take by mouth, Disp: , Rfl:     Levonorgest-Eth Estrad 91-Day 0.1-0.02 & 0.01 MG TABS, Take by mouth, Disp: , Rfl:     Examination:    Vitals: not taken in person, most recent vitals in chart reviewed  There were no vitals filed for this visit. Wt Readings from Last 3 Encounters:   04/22/20 160 lb (72.6 kg)   03/02/20 195 lb 9.6 oz (88.7 kg)   04/25/19 169 lb (76.7 kg) (91 %, Z= 1.36)*     * Growth percentiles are based on CDC (Girls, 2-20 Years) data.        Labs:   no recent labs    Mental Status Examination:    Level of consciousness:  alert and oriented to person, place, and situation  Appearance:  well-appearing good grooming and good hygiene  Behavior/Motor:  no abnormalities noted  Attitude toward examiner: friendly, pleasant and cooperative, attentive and good eye contact  Speech:  spontaneous, normal rate and normal volume   Mood: \"better\"  Affect:  mood congruent  Thought processes:  linear and logical  Thought content:  Denies suicidal or homicidal ideation, denies auditory or visual hallucinations  Cognition:  no deficits in attention, concentration notable, recent memory grossly intact  Concentration intact  Memory intact  Insight good   Judgement fair   Fund of Knowledge adequate    Treatment Plan:  Reviewed current Medications with the patient. Education provided on the compliance with treatment. Reviewed OARRs, no concerns identified     The anticipated benefits and side effects of the medications, including the anticipated results of not receiving the medication, and of alternatives to the medications were explained to the patient and their informed consent was obtained for starting medications as well as adjusting the doses (titration or tapering) as indicated. The above information was given by physician in verbal form and sufficient understanding was in evidence. The patient participated in discussion of the information and question and/or concerns were addressed before the medication was given. PSYCHOTHERAPY/COUNSELING:  Encourage patient to attend outpatient appointments and therapy. [x] Therapeutic interview  [] Supportive  [x] CBT  [x] Ongoing  [] Other    No follow-ups on file. Follow-up in 2 weeks, patient informed to call for follow-up    Please note this report has been partially produced using speech recognition software  And may cause contain errors related to that system including grammar, punctuation and spelling as well as words and phrases that may seem inappropriate. If there are questions or concerns please feel free to contact me to clarify.     Norah Muro MD  Electronically signed by Shantel Virgen MD on 2/25/2021 at 9:41 AM  2/25/2021 9:41 AM    Psychiatry   Due to this being a TeleHealth encounter, evaluation of the following organ systems is limited: Vitals/Constitutional/EENT/Resp/CV/GI//MS/Neuro/Skin/Heme-Lymph-Imm. An  electronic signature was used to authenticate this note. --Shantel Virgen MD on 2/25/2021 at 9:41 AM    Pursuant to the emergency declaration under the [address] [place] [address], [facility] waiver authority and the [employer] and [employer] Act, this Virtual  Visit was conducted, with patient's consent, to reduce the patient's risk of exposure to COVID-19 and provide continuity of care for an established patient Services were provided through a video synchronous discussion virtually to substitute for in-person clinic visit.

## 2021-02-26 ENCOUNTER — TELEPHONE (OUTPATIENT)
Dept: BEHAVIORAL/MENTAL HEALTH CLINIC | Age: 21
End: 2021-02-26

## 2021-02-26 NOTE — TELEPHONE ENCOUNTER
propranolol (INDERAL) 10 MG tablet [[id number]]     Order Details  Dose: 10 mg Route: Oral Frequency: 3 TIMES DAILY PRN for anxiety   Dispense Quantity: 180 tablet Refills: 3          Sig: Take 1 tablet by mouth 3 times daily as needed (anxiety)         Start Date: 02/25/21 End Date: --   Written Date: 02/25/21 Expiration Date: 02/25/22   Original Order:  propranolol (INDERAL) 10 MG tablet [[id number]]   Providers    Authorizing Provider: Katiuska Ely MD NPI: [id number]   Ordering User:  Katiuska Ely MD        Pharmacy    [facility]) - [place], [address] [phone] - F [phone]   [place] [address]   Phone:  [phone]  Fax:  [phone]     OFFICE RECEIVED PHONE [facility] ([phone] REF# [id number])     THEY CAN NOT FILL THIS RX UNLESS IT IS FOR #90 DAYS.   PLEASE ADVISE

## 2021-03-02 ENCOUNTER — PATIENT MESSAGE (OUTPATIENT)
Dept: BEHAVIORAL/MENTAL HEALTH CLINIC | Age: 21
End: 2021-03-02

## 2021-03-11 RX ORDER — PROPRANOLOL HYDROCHLORIDE 10 MG/1
10 TABLET ORAL 3 TIMES DAILY PRN
Qty: 270 TABLET | Refills: 1 | Status: SHIPPED | OUTPATIENT
Start: 2021-03-11 | End: 2021-07-29 | Stop reason: SDUPTHER

## 2021-06-07 NOTE — TELEPHONE ENCOUNTER
PLEASE NOTE. PATIENT ASKING FOR A SHORT 30 DAY SUPPLY TO BE SENT TO HER ON VACATION. SHE WILL BE OUT OF MEDICATION BEFORE PREVIOUSLY ORDERED [id number] [facility] [address] ARRIVES.

## 2021-06-08 RX ORDER — LAMOTRIGINE 100 MG/1
100 TABLET ORAL DAILY
Refills: 0 | OUTPATIENT
Start: 2021-06-08

## 2021-07-23 NOTE — TELEPHONE ENCOUNTER
Rx requested:  Requested Prescriptions     Pending Prescriptions Disp Refills    propranolol (INDERAL) 10 MG tablet 270 tablet 1     Sig: Take 1 tablet by mouth 3 times daily as needed (anxiety)       Last Office Visit:   2/25/2021      Next Visit Date:  Future Appointments   Date Time Provider Mikayla Alexander   8/2/2021  8:00 AM Abril He MD [address] = [address]

## 2021-07-29 RX ORDER — PROPRANOLOL HYDROCHLORIDE 10 MG/1
10 TABLET ORAL 3 TIMES DAILY PRN
Qty: 270 TABLET | Refills: 1 | Status: SHIPPED | OUTPATIENT
Start: 2021-07-29 | End: 2021-12-09 | Stop reason: SDUPTHER

## 2021-08-02 ENCOUNTER — VIRTUAL VISIT (OUTPATIENT)
Dept: BEHAVIORAL/MENTAL HEALTH CLINIC | Age: 21
End: 2021-08-02
Payer: COMMERCIAL

## 2021-08-02 DIAGNOSIS — F31.81 BIPOLAR 2 DISORDER (HCC): Primary | ICD-10-CM

## 2021-08-02 PROCEDURE — 99214 OFFICE O/P EST MOD 30 MIN: CPT | Performed by: PSYCHIATRY & NEUROLOGY

## 2021-08-02 PROCEDURE — G8428 CUR MEDS NOT DOCUMENT: HCPCS | Performed by: PSYCHIATRY & NEUROLOGY

## 2021-08-02 RX ORDER — HYDROXYZINE HYDROCHLORIDE 25 MG/1
25 TABLET, FILM COATED ORAL NIGHTLY PRN
Qty: 90 TABLET | Refills: 1 | Status: SHIPPED | OUTPATIENT
Start: 2021-08-02 | End: 2022-01-29

## 2021-08-02 RX ORDER — LAMOTRIGINE 100 MG/1
100 TABLET ORAL DAILY
Qty: 90 TABLET | Refills: 1 | Status: SHIPPED | OUTPATIENT
Start: 2021-08-02 | End: 2021-12-13 | Stop reason: SDUPTHER

## 2021-08-02 RX ORDER — PRAZOSIN HYDROCHLORIDE 1 MG/1
1 CAPSULE ORAL NIGHTLY
Qty: 90 CAPSULE | Refills: 1 | Status: SHIPPED | OUTPATIENT
Start: 2021-08-02 | End: 2021-08-06

## 2021-08-02 NOTE — PROGRESS NOTES
8/2/2021    35 mins total for this encounter =     25 minutes with direct communication with patient for encounter, 10 mins (in addition) spent on chart review, and in the ordering of all necessary labs and/or medications      The risks and benefits of converting to a virtual visit were discussed in light of the current infectious disease epidemic. Patient also understood that insurance coverage and co-pays are up to their individual insurance plans. Patient Location:        Patient's home address  Provider Location ([facility]/State):        [facility], [address] [place] [address] (During USOCN-54 public health emergency)    Psychiatric Diagnoses:  1. Bipolar 2 disorder (HCC)        Assessment/Plan:   Patient denies thoughts of harm to self or others. No acute safety concerns voiced at this appointment. MOOD: IMPROVEMENT: Patient reports improvement in symptoms of low mood, low energy and low motivation. Denies anhedonia. Feels able to attend to activities of daily living. SLEEP: IMPROVED: Sleeping better, continues to struggle with sleep somewhat. Sleeping 6-8 hours a night. feels well rested    Patient reports they have been engaging in light/moderate regular physical activity on a consistent basis. gym 3xs a week     ATTENTION AND FOCUS: No concerns. No recent issues related to difficulty with attention or focus. ANXIETY: WELL-CONTROLLED: Significant improvement in anxiety. Able to utilize coping skills effectively to manage anxiety. Patient reports minimal anxiety throughout the day and is able to accomplish goals and attend to activities of daily living. BIPOLAR ROBBY: NO ROBBY/HYPOMANIA REPORTED: Patient denies recent symptoms of robby including racing thoughts, increased goal-directed activity, decreased need for sleep, increased energy, persistently elevated or irritable mood, impulsivity, grandiosity, paranoid thoughts or other signs of robby.      SUBSTANCE USE: No concerns related to substance use. Not applicable. ASSESSMENT/PLAN: NO CHANGES TO MEDICATIONS: Patient doing well on current medication regimen. No changes. Mood improved, less depressed and more motivated. Less anxiety throughout the day, able to attend to ADLs. Continue to encourage physical activity and adherence to medication regimen. Denies auditory or visual hallucinations. No paranoid thoughts, no delusional thought content expressed in this appointment. No thoughts of harm to self or others voiced. No acute concerns voiced. COUNSELING or THERAPY:   Continue to encourage therapy as part of ongoing treatment of their mental health concerns. Continue to encourage physical activity and adherence to medication regimen. DATE and changes made  · 2/25/21  ? Grades are good   ? Mood stable, managing multiple stressors  ? No depressed mood   ? Anxiety is well-controlled   ? A little more energy at night than she thinks she needs, but no racing thoughts and is falling asleep consistently    ? Notices slightly faster talking  - not wanting to increase lamictal due to concern this is causing weight gain   ? Discussed this is more likely due to propranolol   ? lamictal is likely suboptimal dosing, patient is aware she may be at increased risk of manic episode   ?  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   · Looking at Acetylcholine and psychosis   · Says she is the  in the lab   · Lamotrigine (Lamictal) 100 mg daily well tolerated       Medical Diagnoses:  Patient Active Problem List   Diagnosis    Excessive or frequent menstruation    Migraine with aura and without status migrainosus, not intractable    Acute intractable headache    Astigmatism    Idiopathic peripheral neuropathy    Muscle ache of extremity    Regular astigmatism, bilateral    Vitreous floaters of both eyes    Bipolar 2 disorder ([facility] [address]         AT TODAY'S VISIT     Crisis plan reviewed and patient verbally contracts for safety. Go to ED with emergent symptoms or safety concerns. Risks, benefits, side effects of medications, including any / all black box warnings, discussed with patient, who verbalizes their understanding. Pt is brianna for safety and denies thoughts of SI/HI. Amenable to plan. No acute concerns to address regarding medications. Subjective:      Patient denies medication side effects apart from those mentioned in the assessment and plan. Patient reports they have been compliant with current medication regimen and have not missed a dose. At today's visit, patient denies thoughts of harm to self or others since last appointment, and denies auditory or visual hallucinations. ROS:  [x] All negative/unchanged except if checked. Explain positive(checked items) below:       Denies any new or changed physical symptoms other than those noted in the subjective portion of this note   [] Constitutional  [] Eyes  [] Ear/Nose/Mouth/Throat  [] Respiratory  [] CV  [] GI  []   [] Musculoskeletal  [] Skin/Breast  [] Neurological  [] Endocrine  [] Heme/Lymph  [] Allergic/Immunologic    OBJECTIVE:   No results found for this or any previous visit (from the past 1008 hour(s)).     MEDICATIONS:    Current Outpatient Medications:     propranolol (INDERAL) 10 MG tablet, Take 1 tablet by mouth 3 times daily as needed (anxiety), Disp: 270 tablet, Rfl: 1    lamoTRIgine (LAMICTAL) 100 MG tablet, Take 1 tablet by mouth daily, Disp: 30 tablet, Rfl: 2    ondansetron (ZOFRAN ODT) 4 MG disintegrating tablet, Take 1 tablet by mouth every 8 hours as needed for Nausea, Disp: 10 tablet, Rfl: 0    tamsulosin (FLOMAX) 0.4 MG capsule, Take 0.4 mg by mouth daily, Disp: , Rfl:     Biotin w/ Vitamins C & E (HAIR SKIN & NAILS GUMMIES PO), Take by mouth, Disp: , Rfl:     Multiple Vitamins-Minerals (MULTIVITAMIN GUMMIES ADULT PO), Take by mouth, Disp: , Rfl:     Levonorgest-Eth Estrad 91-Day 0.1-0.02 & 0.01 MG TABS, Take by mouth, Disp: , Rfl:     Examination:    Vitals: not taken in person, most recent vitals in chart reviewed  There were no vitals filed for this visit. Wt Readings from Last 3 Encounters:   04/22/20 160 lb (72.6 kg)   03/02/20 195 lb 9.6 oz (88.7 kg)   04/25/19 169 lb (76.7 kg) (91 %, Z= 1.36)*     * Growth percentiles are based on CDC (Girls, 2-20 Years) data. BP Readings from Last 3 Encounters:   04/22/20 110/85   03/02/20 136/84   04/25/19 120/74         Mental Status Examination:    Level of consciousness:  alert and oriented to person, place, and situation  Appearance:  well-appearing good grooming and good hygiene  Behavior/Motor:  no abnormalities noted  Attitude toward examiner: friendly, pleasant and cooperative, attentive and good eye contact  Speech:  spontaneous, normal rate and normal volume   Mood: \"good and stable\"  Affect:  mood congruent  Thought processes:  linear and logical  Thought content:  Denies suicidal or homicidal ideation, denies auditory or visual hallucinations  Cognition:  no deficits in attention, concentration notable, recent memory grossly intact  Concentration intact  Memory intact  Insight good   Judgement fair   Fund of Knowledge adequate    PSYCHOTHERAPY/COUNSELING:  Encourage patient to attend outpatient appointments and therapy. [x] Therapeutic interview  [] Supportive  [x] CBT  [x] Ongoing  [] Other    No follow-ups on file. patient informed to call for follow-up or to reschedule appointment     Please note this report has been partially produced using speech recognition software  And may cause contain errors related to that system including grammar, punctuation and spelling as well as words and phrases that may seem inappropriate. If there are questions or concerns please feel free to contact me to clarify.     Helene Saba, MD  Electronically signed by Ursula Mccurdy MD on 8/2/2021 at 5:07 PM  8/2/2021 5:07 PM    Psychiatry   Due to this being a TeleHealth encounter, evaluation of the following organ systems is limited: Vitals/Constitutional/EENT/Resp/CV/GI//MS/Neuro/Skin/Heme-Lymph-Imm. An  electronic signature was used to authenticate this note. --Ursula Mccurdy MD on 8/2/2021 at 5:07 PM    Pursuant to the emergency declaration under the [address] [place] [address] waiver authority and the [employer] Act, this Virtual  Visit was conducted, with patient's consent, to reduce the patient's risk of exposure to COVID-19 and provide continuity of care for an established patient Services were provided through a video synchronous discussion virtually to substitute for in-person clinic visit. Treatment Plan:  Reviewed current Medications with the patient. Education provided on the compliance with treatment. Reviewed OARRs, no concerns identified     The anticipated benefits and side effects of the medications, including the anticipated results of not receiving the medication, and of alternatives to the medications were explained to the patient and their informed consent was obtained for starting medications as well as adjusting the doses (titration or tapering) as indicated. The above information was given by physician in verbal form and sufficient understanding was in evidence. The patient participated in discussion of the information and question and/or concerns were addressed before the medication was given. Due to COVID 19 outbreak, patient's office visit was converted to a virtual visit.   Patient was contacted and agreed to proceed with a virtual visit via DOXY

## 2021-08-04 NOTE — TELEPHONE ENCOUNTER
prazosin (MINIPRESS) 1 MG capsule [[id number]]     Order Details  Dose: 1 mg Route: Oral Frequency: NIGHTLY   Dispense Quantity: 90 capsule Refills: 1          Sig: Take 1 capsule by mouth nightly After 4 nights, Ok to increase to 2 mg (two capsules) each night if tolerated with no dizziness.         Start Date: 08/02/21 End Date: 01/29/22 after 180 doses   Written Date: 08/02/21 Expiration Date: 08/02/22   Providers    Authorizing Provider: Remigio Hassan MD NPI: [id number]   Ordering User:  Remigio Hassan MD        Pharmacy    [facility]) - [place], [address] [phone] - F [phone]   [address] [place] [address]   Phone:  [phone]  Fax:  [phone]     Pharmacy called stating the can not fill with this sig. Suggested changing quantity to 180 and then they can fill. Please advise.

## 2021-08-06 RX ORDER — PRAZOSIN HYDROCHLORIDE 1 MG/1
2 CAPSULE ORAL NIGHTLY
Qty: 180 CAPSULE | Refills: 0
Start: 2021-08-06 | End: 2021-10-13 | Stop reason: SDUPTHER

## 2021-08-06 RX ORDER — PRAZOSIN HYDROCHLORIDE 1 MG/1
CAPSULE ORAL
Qty: 176 CAPSULE | Refills: 0
Start: 2021-08-06 | End: 2021-10-13

## 2021-08-06 NOTE — TELEPHONE ENCOUNTER
Per Dr. Sarah Ray -consent to give #180 tabs  [facility] was informed Spoke with [facility] verbal consent-     Prazosin 1MG take 1 Capsule by mouth nightly, After 4 nights, OK to increase to 2MG (two capsules) each night if tolerated with no dizziness  #176 with no refills. Maintenance dose:  Prazoxin 1MG take 2 capsules by mouth nightly.  #180 capsules with no refills

## 2021-10-11 NOTE — TELEPHONE ENCOUNTER
requesting medication refill.      Rx requested:  Requested Prescriptions     Pending Prescriptions Disp Refills    prazosin (MINIPRESS) 1 MG capsule 180 capsule 0     Sig: Take 2 capsules by mouth nightly       Last Office Visit:   8/2/2021    Last Tox screen:        Last Medication refill:8/6/2021      Next Visit Date:  Future Appointments   Date Time Provider Mikayla Alexander   11/8/2021  8:00 AM Karolyn Bhatia MD [place]

## 2021-10-13 RX ORDER — PRAZOSIN HYDROCHLORIDE 1 MG/1
2 CAPSULE ORAL NIGHTLY
Qty: 180 CAPSULE | Refills: 0 | Status: SHIPPED | OUTPATIENT
Start: 2021-10-13 | End: 2022-03-18 | Stop reason: SDUPTHER

## 2021-12-09 RX ORDER — PROPRANOLOL HYDROCHLORIDE 10 MG/1
10 TABLET ORAL 3 TIMES DAILY PRN
Qty: 270 TABLET | Refills: 0 | Status: SHIPPED | OUTPATIENT
Start: 2021-12-09

## 2021-12-09 NOTE — TELEPHONE ENCOUNTER
requesting medication refill. Rx requested:  Requested Prescriptions     Pending Prescriptions Disp Refills    propranolol (INDERAL) 10 MG tablet 270 tablet 0     Sig: Take 1 tablet by mouth 3 times daily as needed (anxiety)       Last Office Visit:   8/2/2021    Last Tox screen:        Last Medication contract:    Last refill: 7/29/2021    Next Visit Date:  No future appointments.

## 2021-12-13 RX ORDER — LAMOTRIGINE 100 MG/1
100 TABLET ORAL DAILY
Qty: 90 TABLET | Refills: 0 | Status: SHIPPED | OUTPATIENT
Start: 2021-12-13 | End: 2022-02-17 | Stop reason: SDUPTHER

## 2021-12-13 NOTE — TELEPHONE ENCOUNTER
requesting medication refill. Rx requested:  Requested Prescriptions     Pending Prescriptions Disp Refills    lamoTRIgine (LAMICTAL) 100 MG tablet 90 tablet 1     Sig: Take 1 tablet by mouth daily       Last Office Visit:   8/2/2021    Last Tox screen:    Last refill: 8/2/2021    Last Medication contract:        Next Visit Date:  No future appointments.

## 2022-02-17 RX ORDER — LAMOTRIGINE 100 MG/1
100 TABLET ORAL DAILY
Qty: 90 TABLET | Refills: 0 | Status: SHIPPED | OUTPATIENT
Start: 2022-02-17 | End: 2022-03-18 | Stop reason: SDUPTHER

## 2022-03-08 ENCOUNTER — PATIENT MESSAGE (OUTPATIENT)
Dept: BEHAVIORAL/MENTAL HEALTH CLINIC | Age: 22
End: 2022-03-08

## 2022-03-08 NOTE — TELEPHONE ENCOUNTER
From: Nidia Oneal  To: Dr. Galarza Huh: 3/8/2022 12:12 PM EST  Subject: Upping Prescription for Depression Symptoms    Hi Dr. Asael Ahumada,    I hope you are doing well. Starting about two weeks ago, I had an increase in depression symptoms - feeling incredibly unmotivated, everything feeling like it takes a lot of effort, difficulty focussing, and not finding interest in a lot of things I used to find interesting, as well as becoming very agitated. It's begun to affect my work life and my personal life. In the past we had talked about upping my lamictal prescription (although that was mainly to [employer]). I was wondering if this would be possible (or even appropriate) now? Alternatively, do you think it would be appropriate to supplement?     Thanks,  Octavio Allan

## 2022-03-18 ENCOUNTER — TELEMEDICINE (OUTPATIENT)
Dept: BEHAVIORAL/MENTAL HEALTH CLINIC | Age: 22
End: 2022-03-18
Payer: COMMERCIAL

## 2022-03-18 DIAGNOSIS — F31.81 BIPOLAR 2 DISORDER (HCC): Primary | ICD-10-CM

## 2022-03-18 DIAGNOSIS — F41.1 GAD (GENERALIZED ANXIETY DISORDER): ICD-10-CM

## 2022-03-18 PROCEDURE — G8427 DOCREV CUR MEDS BY ELIG CLIN: HCPCS | Performed by: PSYCHIATRY & NEUROLOGY

## 2022-03-18 PROCEDURE — 99215 OFFICE O/P EST HI 40 MIN: CPT | Performed by: PSYCHIATRY & NEUROLOGY

## 2022-03-18 RX ORDER — PRAZOSIN HYDROCHLORIDE 2 MG/1
2 CAPSULE ORAL NIGHTLY
Qty: 90 CAPSULE | Refills: 1 | Status: SHIPPED | OUTPATIENT
Start: 2022-03-18 | End: 2022-09-14 | Stop reason: SDUPTHER

## 2022-03-18 RX ORDER — MULTIVIT-MIN/IRON/FOLIC ACID/K 18-600-40
1 CAPSULE ORAL DAILY
COMMUNITY

## 2022-03-18 RX ORDER — LAMOTRIGINE 150 MG/1
75 TABLET ORAL 2 TIMES DAILY
Qty: 90 TABLET | Refills: 1 | Status: SHIPPED | OUTPATIENT
Start: 2022-03-18 | End: 2022-05-16 | Stop reason: SDUPTHER

## 2022-03-18 NOTE — PROGRESS NOTES
3/18/2022    40 mins total for this encounter =     30 minutes with direct communication with patient for encounter     10 mins (in addition) spent on chart review, and in the ordering of all necessary labs and/or medications      The risks and benefits of converting to a virtual visit were discussed in light of the current infectious disease epidemic. Patient also understood that insurance coverage and co-pays are up to their individual insurance plans. Patient Location:        Patient's home address  Provider Location (City/State):        [address] (OUTPATIENT)   Audio/Visual (During FKYBV-58 public health emergency)          Psychiatric Diagnoses:  1. Bipolar 2 disorder ([facility] [address]    2. YANIRA (generalized anxiety disorder)        Assessment/Plan:   Was struggling to have motivation or focus when trying to work on a paper   Patient says that that person thinks she is \"out to get them\"   Had a conflict at work, a coworker yelled at her, and now they are distancing themselves from each other at work       Patient denies thoughts of harm to self or others. No acute safety concerns voiced at this appointment. MOOD: VARIABLE MOOD with MIXED BRIEF EPISODES OF LOW MOOD INTERSPERSED WITH EUTHYMIA: Patient continues to experience symptoms of low mood, low energy and low motivation, but symptoms are only lasting for days at this time. Also reports at other times, is experiencing relatively good mood . Still motivated to complete necessary work and attend to activities of daily living. for about 3 weeks, prior to a week ago, patient had significant depression symptoms with low mood, anhedonia, tearfulness, apathy, anergia. Says this began to improve several days ago. SLEEP: SLEEP DURATION: sleeping 9 hours a night. Stopped taking prazosin (Minipress), says she has not had nightmares for a long time. ATTENTION AND FOCUS: No concerns.  No recent issues related to difficulty with attention or focus. Patient reports they have been exercising/walking on a daily basis. and Occasionally going for walks. ANXIETY: MILD INTERMITTENT CONCERNS: Patient reports continued symptoms of anxiety. These intermittent concerns have recently caused some mild but notable difficulties in interpersonal relationships and/or with performing duties at work. Has been using propranolol hcl (Inderal) more like 1 time a day, on occasion twice daily because of more environmental stress. BIPOLAR ROBBY: NO ROBBY/HYPOMANIA REPORTED: Patient denies recent symptoms of robby including racing thoughts, increased goal-directed activity, decreased need for sleep, increased energy, persistently elevated or irritable mood, impulsivity, grandiosity, paranoid thoughts or other signs of robby. SUBSTANCE USE: No concerns related to substance use. Not applicable. and No concerns for ongoing substance use. Patient denies any recent substance use    ASSESSMENT/PLAN: Medication changes needed given the current presentation of symptoms. Patient was amenable to plan related to medications and endorsed understanding of the risks and benefits, which were explained and discussed. No acute concerns. No thoughts of harm to self or others voiced. COUNSELING or THERAPY:   Continue to encourage therapy as part of ongoing treatment of their mental health concerns. Continue to encourage physical activity and adherence to medication regimen.      DATE and changes made                       3/18/22  -INCREASE lamotrigine (Lamictal) to 75 mg twice daily from 100 mg daily   -continue prazosin (Minipress) (patient had stopped but will restart this due to needing to be back on medication as nightmares have returned) - 2 mg every night   - follow-up in one month                 Medical Diagnoses:  Patient Active Problem List   Diagnosis    Excessive or frequent menstruation    Migraine with aura and without status migrainosus, not intractable    Acute intractable headache    Astigmatism    Idiopathic peripheral neuropathy    Muscle ache of extremity    Regular astigmatism, bilateral    Vitreous floaters of both eyes    Bipolar 2 disorder ([facility] [address]         AT TODAY'S VISIT     Crisis plan reviewed and patient verbally contracts for safety. Go to ED with emergent symptoms or safety concerns. Risks, benefits, side effects of medications, including any / all black box warnings, discussed with patient, who verbalizes their understanding. Pt is brianna for safety and denies thoughts of SI/HI. Amenable to plan. No acute concerns to address regarding medications. Subjective:      Patient denies medication side effects apart from those mentioned in the assessment and plan. Patient reports they have been compliant with current medication regimen and have not missed a dose. At today's visit, patient denies thoughts of harm to self or others since last appointment, and denies auditory or visual hallucinations. ROS:  [x] All negative/unchanged except if checked. Explain positive(checked items) below:       Denies any new or changed physical symptoms other than those noted in the subjective portion of this note   [] Constitutional  [] Eyes  [] Ear/Nose/Mouth/Throat  [] Respiratory  [] CV  [] GI  []   [] Musculoskeletal  [] Skin/Breast  [] Neurological  [] Endocrine  [] Heme/Lymph  [] Allergic/Immunologic    OBJECTIVE:   No results found for this or any previous visit (from the past 1008 hour(s)).     MEDICATIONS:    Current Outpatient Medications:     Cholecalciferol (VITAMIN D) 50 MCG (2000 UT) CAPS capsule, Take 1 capsule by mouth daily, Disp: , Rfl:     prazosin (MINIPRESS) 2 MG capsule, Take 1 capsule by mouth nightly, Disp: 90 capsule, Rfl: 1    lamoTRIgine (LAMICTAL) 150 MG tablet, Take 0.5 tablets by mouth 2 times daily, Disp: 90 tablet, Rfl: 1    propranolol (INDERAL) 10 MG tablet, Take 1 tablet by mouth 3 times daily as needed (anxiety), Disp: 270 tablet, Rfl: 0    ondansetron (ZOFRAN ODT) 4 MG disintegrating tablet, Take 1 tablet by mouth every 8 hours as needed for Nausea, Disp: 10 tablet, Rfl: 0    tamsulosin (FLOMAX) 0.4 MG capsule, Take 0.4 mg by mouth daily, Disp: , Rfl:     Biotin w/ Vitamins C & E (HAIR SKIN & NAILS GUMMIES PO), Take by mouth, Disp: , Rfl:     Multiple Vitamins-Minerals (MULTIVITAMIN GUMMIES ADULT PO), Take by mouth, Disp: , Rfl:     Levonorgest-Eth Estrad 91-Day 0.1-0.02 & 0.01 MG TABS, Take by mouth, Disp: , Rfl:     Examination:    Vitals: not taken in person, most recent vitals in chart reviewed  There were no vitals filed for this visit. Wt Readings from Last 3 Encounters:   04/22/20 160 lb (72.6 kg)   03/02/20 195 lb 9.6 oz (88.7 kg)   04/25/19 169 lb (76.7 kg) (91 %, Z= 1.36)*     * Growth percentiles are based on CDC (Girls, 2-20 Years) data. BP Readings from Last 3 Encounters:   04/22/20 110/85   03/02/20 136/84   04/25/19 120/74         Mental Status Examination:    Level of consciousness:  alert and oriented to person, place, and situation  Appearance:  well-appearing good grooming and good hygiene  Behavior/Motor:  no abnormalities noted  Attitude toward examiner: friendly, pleasant and cooperative, attentive and good eye contact  Speech:  spontaneous, normal rate and normal volume   Mood: \"good\"  Affect:  mood congruent  Thought processes:  linear and logical  Thought content:  Denies suicidal or homicidal ideation, denies auditory or visual hallucinations  Cognition:  no deficits in attention, concentration notable, recent memory grossly intact  Concentration intact  Memory intact  Insight good   Judgement fair   Fund of Knowledge adequate    PSYCHOTHERAPY/COUNSELING:  Encourage patient to attend outpatient appointments and therapy. [x] Therapeutic interview  [] Supportive  [x] CBT  [x] Ongoing  [] Other    No follow-ups on file.  patient informed to call for follow-up or to reschedule appointment     Please note this report has been partially produced using speech recognition software  And may cause contain errors related to that system including grammar, punctuation and spelling as well as words and phrases that may seem inappropriate. If there are questions or concerns please feel free to contact me to clarify. Kyle Stewart MD  Electronically signed by Kyle Stewart MD on 3/18/2022 at 10:31 AM  3/18/2022 10:31 AM    Psychiatry   Due to this being a TeleHealth encounter, evaluation of the following organ systems is limited: Vitals/Constitutional/EENT/Resp/CV/GI//MS/Neuro/Skin/Heme-Lymph-Imm. An  electronic signature was used to authenticate this note. --Kyle Stewart MD on 3/18/2022 at 10:31 AM    Pursuant to the emergency declaration under the [address] [place] [address], [facility] waiver authority and the [employer] Act, this Virtual  Visit was conducted, with patient's consent, to reduce the patient's risk of exposure to COVID-19 and provide continuity of care for an established patient Services were provided through a video synchronous discussion virtually to substitute for in-person clinic visit. Treatment Plan:  Reviewed current Medications with the patient. Education provided on the compliance with treatment. Reviewed OARRs, no concerns identified     The anticipated benefits and side effects of the medications, including the anticipated results of not receiving the medication, and of alternatives to the medications were explained to the patient and their informed consent was obtained for starting medications as well as adjusting the doses (titration or tapering) as indicated. The above information was given by physician in verbal form and sufficient understanding was in evidence.  The patient participated in discussion of the information and question and/or concerns were addressed before the medication was given. Due to COVID 19 outbreak, patient's office visit was converted to a virtual visit.   Patient was contacted and agreed to proceed with a virtual visit via DOXY

## 2022-05-16 ENCOUNTER — TELEMEDICINE (OUTPATIENT)
Dept: BEHAVIORAL/MENTAL HEALTH CLINIC | Age: 22
End: 2022-05-16
Payer: COMMERCIAL

## 2022-05-16 DIAGNOSIS — F33.1 MODERATE EPISODE OF RECURRENT MAJOR DEPRESSIVE DISORDER (HCC): ICD-10-CM

## 2022-05-16 DIAGNOSIS — F41.1 GAD (GENERALIZED ANXIETY DISORDER): Primary | ICD-10-CM

## 2022-05-16 PROCEDURE — 4004F PT TOBACCO SCREEN RCVD TLK: CPT | Performed by: PSYCHIATRY & NEUROLOGY

## 2022-05-16 PROCEDURE — G8421 BMI NOT CALCULATED: HCPCS | Performed by: PSYCHIATRY & NEUROLOGY

## 2022-05-16 PROCEDURE — 99215 OFFICE O/P EST HI 40 MIN: CPT | Performed by: PSYCHIATRY & NEUROLOGY

## 2022-05-16 PROCEDURE — G8427 DOCREV CUR MEDS BY ELIG CLIN: HCPCS | Performed by: PSYCHIATRY & NEUROLOGY

## 2022-05-16 RX ORDER — LAMOTRIGINE 150 MG/1
75 TABLET ORAL 2 TIMES DAILY
Qty: 90 TABLET | Refills: 1 | Status: SHIPPED | OUTPATIENT
Start: 2022-05-16

## 2022-05-16 NOTE — PROGRESS NOTES
5/16/2022    40 mins total for this encounter =     30 minutes with direct communication with patient for encounter     10 mins (in addition) spent on chart review, and in the ordering of all necessary labs and/or medications      The risks and benefits of converting to a virtual visit were discussed in light of the current infectious disease epidemic. Patient also understood that insurance coverage and co-pays are up to their individual insurance plans. Patient Location:        Patient's home address  Provider Location ([facility]/State):        [address] (OUTPATIENT)   Audio/Visual (During VFDES-09 public health emergency)          Psychiatric Diagnoses:  1. YANIRA (generalized anxiety disorder)    2. Moderate episode of recurrent major depressive disorder (HCC)        Assessment/Plan:   Has had more difficulty falling asleep since starting lamotrigine (Lamictal)   prazosin (Minipress) takes occasionally   lamotrigine (Lamictal) 75 mg twice daily has been working well   No concerns about her medication and feels it is helping     Patient denies thoughts of harm to self or others. No acute safety concerns voiced at this appointment. MOOD: Patient reports mood has been stable. Patient has been able to attend to activities of daily living, has good energy, good mood, and good motivation. SLEEP: Patient reports sleep has been at least 8 hours a night and wakes feeling rested in the morning. No concerns related to sleep today    ATTENTION AND FOCUS: Patient denies any concerns related to attention and focus, and no concerns related to memory. Occasionally going for walks. ANXIETY: Patient denies any concerns related to anxiety today. BIPOLAR ROBBY: No concerns. No manic symptoms endorsed today and no concern for robby. SUBSTANCE USE: No concerns for ongoing substance use. Patient denies any recent substance use    ASSESSMENT/PLAN: Medication changes per plan below. Discussed with patient the risks and benefits of their psychiatric medication and patient was amenable to plan as outlined below    COUNSELING or THERAPY:   Continue to encourage therapy as part of ongoing treatment of their mental health concerns. Continue to encourage physical activity and adherence to medication regimen. DATE and changes made                       3/18/22  -INCREASE lamotrigine (Lamictal) to 75 mg twice daily from 100 mg daily   -continue prazosin (Minipress) (patient had stopped but will restart this due to needing to be back on medication as nightmares have returned) - 2 mg every night   - follow-up in one month                          5/27/22  -lamotrigine (Lamictal) INCREASE to 75 mg twice daily for some irritability, mood lability                  Medical Diagnoses:  Patient Active Problem List   Diagnosis    Excessive or frequent menstruation    Migraine with aura and without status migrainosus, not intractable    Acute intractable headache    Astigmatism    Idiopathic peripheral neuropathy    Muscle ache of extremity    Regular astigmatism, bilateral    Vitreous floaters of both eyes    Bipolar 2 disorder ([facility] [address]         AT TODAY'S VISIT     Crisis plan reviewed and patient verbally contracts for safety. Go to ED with emergent symptoms or safety concerns. Risks, benefits, side effects of medications, including any / all black box warnings, discussed with patient, who verbalizes their understanding. Pt is brianna for safety and denies thoughts of SI/HI. Amenable to plan. No acute concerns to address regarding medications. Subjective:      Patient denies medication side effects apart from those mentioned in the assessment and plan. Patient reports they have been compliant with current medication regimen and have not missed a dose.     At today's visit, patient denies thoughts of harm to self or others since last appointment, and denies auditory or visual grooming and good hygiene  Behavior/Motor:  no abnormalities noted  Attitude toward examiner: friendly, pleasant and cooperative, attentive and good eye contact  Speech:  spontaneous, normal rate and normal volume   Mood: \"good\"  Affect:  mood congruent  Thought processes:  linear and logical  Thought content:  Denies suicidal or homicidal ideation, denies auditory or visual hallucinations  Cognition:  no deficits in attention, concentration notable, recent memory grossly intact  Concentration intact  Memory intact  Insight good   Judgement fair   Fund of Knowledge adequate    PSYCHOTHERAPY/COUNSELING:  Encourage patient to attend outpatient appointments and therapy. [x] Therapeutic interview  [] Supportive  [x] CBT  [x] Ongoing  [] Other     patient informed to call for follow-up or to reschedule appointment     Please note this report has been partially produced using speech recognition software  And may cause contain errors related to that system including grammar, punctuation and spelling as well as words and phrases that may seem inappropriate. If there are questions or concerns please feel free to contact me to clarify. Chelsy Johnson MD  Electronically signed by Chelsy Johnson MD on 5/27/2022 at 1:08 PM  5/27/2022 1:08 PM    Psychiatry   Due to this being a TeleHealth encounter, evaluation of the following organ systems is limited: Vitals/Constitutional/EENT/Resp/CV/GI//MS/Neuro/Skin/Heme-Lymph-Imm. An  electronic signature was used to authenticate this note.   --Chelsy Johnson MD on 5/27/2022 at 1:08 PM    Pursuant to the emergency declaration under the [facility] waiver authority and the [employer] Act, this Virtual  Visit was conducted, with patient's consent, to reduce the patient's risk of exposure to COVID-19 and provide continuity of care for an established patient Services were provided through a video synchronous discussion virtually to substitute for in-person clinic visit. Treatment Plan:  Reviewed current Medications with the patient. Education provided on the compliance with treatment. Reviewed OARRs, no concerns identified     The anticipated benefits and side effects of the medications, including the anticipated results of not receiving the medication, and of alternatives to the medications were explained to the patient and their informed consent was obtained for starting medications as well as adjusting the doses (titration or tapering) as indicated. The above information was given by physician in verbal form and sufficient understanding was in evidence. The patient participated in discussion of the information and question and/or concerns were addressed before the medication was given. Due to COVID 19 outbreak, patient's office visit was converted to a virtual visit.   Patient was contacted and agreed to proceed with a virtual visit via [employer]

## 2022-09-14 RX ORDER — PRAZOSIN HYDROCHLORIDE 2 MG/1
2 CAPSULE ORAL NIGHTLY
Qty: 90 CAPSULE | Refills: 1 | Status: SHIPPED | OUTPATIENT
Start: 2022-09-14

## 2022-09-14 NOTE — TELEPHONE ENCOUNTER
requesting medication refill. Rx requested:  Requested Prescriptions     Pending Prescriptions Disp Refills    prazosin (MINIPRESS) 2 MG capsule 90 capsule 1     Sig: Take 1 capsule by mouth nightly       Last Office Visit:   5/16/2022    Last Tox screen:      Last Medication contract:      Last refilled on :3/18/2022      Next Visit Date:  No future appointments.

## 2022-11-08 RX ORDER — LAMOTRIGINE 150 MG/1
75 TABLET ORAL 2 TIMES DAILY
Qty: 90 TABLET | Refills: 1 | Status: SHIPPED | OUTPATIENT
Start: 2022-11-08

## 2022-12-12 RX ORDER — PROPRANOLOL HYDROCHLORIDE 10 MG/1
TABLET ORAL
Qty: 270 TABLET | Refills: 0 | Status: SHIPPED | OUTPATIENT
Start: 2022-12-12

## 2022-12-12 RX ORDER — LAMOTRIGINE 150 MG/1
TABLET ORAL
Qty: 90 TABLET | Refills: 0 | Status: SHIPPED | OUTPATIENT
Start: 2022-12-12

## 2023-02-15 RX ORDER — PROPRANOLOL HYDROCHLORIDE 10 MG/1
TABLET ORAL
Qty: 270 TABLET | Refills: 1 | Status: SHIPPED | OUTPATIENT
Start: 2023-02-15

## 2023-02-15 RX ORDER — LAMOTRIGINE 150 MG/1
TABLET ORAL
Qty: 90 TABLET | Refills: 1 | Status: SHIPPED | OUTPATIENT
Start: 2023-02-15

## 2023-02-15 NOTE — TELEPHONE ENCOUNTER
requesting medication refill. Rx requested:  Requested Prescriptions     Pending Prescriptions Disp Refills    lamoTRIgine (LAMICTAL) 150 MG tablet 90 tablet 0    propranolol (INDERAL) 10 MG tablet 270 tablet 0       Last Office Visit:   5/16/2022    Last Tox screen:      Last Medication contract:      Last sent to pharmacy on 12/9/2022      Next Visit Date:  No future appointments.

## 2023-06-01 RX ORDER — LAMOTRIGINE 150 MG/1
TABLET ORAL
Qty: 90 TABLET | Refills: 0 | Status: SHIPPED | OUTPATIENT
Start: 2023-06-01

## 2023-06-01 NOTE — TELEPHONE ENCOUNTER
requesting medication refill. Rx requested:  Requested Prescriptions     Pending Prescriptions Disp Refills    lamoTRIgine (LAMICTAL) 150 MG tablet [Pharmacy Med Name: lamoTRIgine 150 MG TABLET] 90 tablet 0     Sig: Take 1/2 tablet by mouth twice a day       Last Office Visit:   5/16/2022    Last Tox screen:      Last Medication contract:      Last refilled on :      Next Visit Date:  No future appointments. Patient is going to call to get in with  to get any future scripts.

## 2023-06-02 ENCOUNTER — TELEMEDICINE (OUTPATIENT)
Dept: FAMILY MEDICINE CLINIC | Age: 23
End: 2023-06-02
Payer: COMMERCIAL

## 2023-06-02 DIAGNOSIS — F31.81 BIPOLAR 2 DISORDER (HCC): Primary | ICD-10-CM

## 2023-06-02 PROCEDURE — G8421 BMI NOT CALCULATED: HCPCS | Performed by: FAMILY MEDICINE

## 2023-06-02 PROCEDURE — G8427 DOCREV CUR MEDS BY ELIG CLIN: HCPCS | Performed by: FAMILY MEDICINE

## 2023-06-02 PROCEDURE — 99213 OFFICE O/P EST LOW 20 MIN: CPT | Performed by: FAMILY MEDICINE

## 2023-06-02 PROCEDURE — 1036F TOBACCO NON-USER: CPT | Performed by: FAMILY MEDICINE

## 2023-06-02 SDOH — ECONOMIC STABILITY: INCOME INSECURITY: HOW HARD IS IT FOR YOU TO PAY FOR THE VERY BASICS LIKE FOOD, HOUSING, MEDICAL CARE, AND HEATING?: NOT HARD AT ALL

## 2023-06-02 SDOH — ECONOMIC STABILITY: HOUSING INSECURITY
IN THE LAST 12 MONTHS, WAS THERE A TIME WHEN YOU DID NOT HAVE A STEADY PLACE TO SLEEP OR SLEPT IN A SHELTER (INCLUDING NOW)?: NO

## 2023-06-02 SDOH — ECONOMIC STABILITY: TRANSPORTATION INSECURITY
IN THE PAST 12 MONTHS, HAS LACK OF TRANSPORTATION KEPT YOU FROM MEETINGS, WORK, OR FROM GETTING THINGS NEEDED FOR DAILY LIVING?: NO

## 2023-06-02 SDOH — ECONOMIC STABILITY: FOOD INSECURITY: WITHIN THE PAST 12 MONTHS, THE FOOD YOU BOUGHT JUST DIDN'T LAST AND YOU DIDN'T HAVE MONEY TO GET MORE.: NEVER TRUE

## 2023-06-02 SDOH — ECONOMIC STABILITY: FOOD INSECURITY: WITHIN THE PAST 12 MONTHS, YOU WORRIED THAT YOUR FOOD WOULD RUN OUT BEFORE YOU GOT MONEY TO BUY MORE.: NEVER TRUE

## 2023-06-02 ASSESSMENT — PATIENT HEALTH QUESTIONNAIRE - PHQ9
SUM OF ALL RESPONSES TO PHQ QUESTIONS 1-9: 0
SUM OF ALL RESPONSES TO PHQ9 QUESTIONS 1 & 2: 0
SUM OF ALL RESPONSES TO PHQ QUESTIONS 1-9: 0
3. TROUBLE FALLING OR STAYING ASLEEP: 0
6. FEELING BAD ABOUT YOURSELF - OR THAT YOU ARE A FAILURE OR HAVE LET YOURSELF OR YOUR FAMILY DOWN: 0
5. POOR APPETITE OR OVEREATING: 0
8. MOVING OR SPEAKING SO SLOWLY THAT OTHER PEOPLE COULD HAVE NOTICED. OR THE OPPOSITE, BEING SO FIGETY OR RESTLESS THAT YOU HAVE BEEN MOVING AROUND A LOT MORE THAN USUAL: 0
7. TROUBLE CONCENTRATING ON THINGS, SUCH AS READING THE NEWSPAPER OR WATCHING TELEVISION: 0
9. THOUGHTS THAT YOU WOULD BE BETTER OFF DEAD, OR OF HURTING YOURSELF: 0
1. LITTLE INTEREST OR PLEASURE IN DOING THINGS: 0
10. IF YOU CHECKED OFF ANY PROBLEMS, HOW DIFFICULT HAVE THESE PROBLEMS MADE IT FOR YOU TO DO YOUR WORK, TAKE CARE OF THINGS AT HOME, OR GET ALONG WITH OTHER PEOPLE: 0
SUM OF ALL RESPONSES TO PHQ QUESTIONS 1-9: 0
SUM OF ALL RESPONSES TO PHQ QUESTIONS 1-9: 0
2. FEELING DOWN, DEPRESSED OR HOPELESS: 0
4. FEELING TIRED OR HAVING LITTLE ENERGY: 0

## 2023-06-03 ASSESSMENT — ENCOUNTER SYMPTOMS
DIARRHEA: 0
CHEST TIGHTNESS: 0
CONSTIPATION: 0
BLOOD IN STOOL: 0
VOMITING: 0
APNEA: 0
SHORTNESS OF BREATH: 0
ABDOMINAL PAIN: 0
NAUSEA: 0
COUGH: 0